# Patient Record
Sex: FEMALE | Race: BLACK OR AFRICAN AMERICAN | Employment: UNEMPLOYED | URBAN - METROPOLITAN AREA
[De-identification: names, ages, dates, MRNs, and addresses within clinical notes are randomized per-mention and may not be internally consistent; named-entity substitution may affect disease eponyms.]

---

## 2017-05-08 PROBLEM — G47.01 INSOMNIA DUE TO MEDICAL CONDITION: Status: ACTIVE | Noted: 2017-05-08

## 2017-05-08 PROBLEM — K59.00 CONSTIPATION, UNSPECIFIED: Status: ACTIVE | Noted: 2017-05-08

## 2017-05-08 PROBLEM — K21.9 GERD (GASTROESOPHAGEAL REFLUX DISEASE): Status: ACTIVE | Noted: 2017-05-08

## 2017-05-08 PROBLEM — R07.9 CHEST PAIN, UNSPECIFIED: Status: ACTIVE | Noted: 2017-05-08

## 2017-05-08 PROBLEM — F32.9 MAJOR DEPRESSIVE DISORDER WITH SINGLE EPISODE: Status: ACTIVE | Noted: 2017-05-08

## 2017-05-08 PROBLEM — I10 ESSENTIAL HYPERTENSION: Status: ACTIVE | Noted: 2017-05-08

## 2017-05-15 PROBLEM — R07.9 CHEST PAIN: Status: ACTIVE | Noted: 2017-05-15

## 2017-05-15 PROBLEM — E66.9 OBESITY: Status: ACTIVE | Noted: 2017-05-15

## 2017-05-15 PROBLEM — R00.2 PALPITATIONS: Status: ACTIVE | Noted: 2017-05-15

## 2017-06-12 ENCOUNTER — HOSPITAL ENCOUNTER (INPATIENT)
Age: 47
LOS: 2 days | Discharge: HOME | DRG: 287 | End: 2017-06-14
Attending: EMERGENCY MEDICINE | Admitting: INTERNAL MEDICINE
Payer: MEDICARE

## 2017-06-12 ENCOUNTER — APPOINTMENT (OUTPATIENT)
Dept: GENERAL RADIOLOGY | Age: 47
DRG: 287 | End: 2017-06-12
Attending: EMERGENCY MEDICINE
Payer: MEDICARE

## 2017-06-12 DIAGNOSIS — I20.0 UNSTABLE ANGINA (HCC): Primary | ICD-10-CM

## 2017-06-12 PROBLEM — R07.9 CHEST PAIN, UNSPECIFIED: Status: RESOLVED | Noted: 2017-05-08 | Resolved: 2017-06-12

## 2017-06-12 LAB
ANION GAP BLD CALC-SCNC: 9 MMOL/L (ref 7–16)
BASOPHILS # BLD AUTO: 0 K/UL (ref 0–0.2)
BASOPHILS # BLD: 0 % (ref 0–2)
BUN SERPL-MCNC: 10 MG/DL (ref 6–23)
CALCIUM SERPL-MCNC: 9.4 MG/DL (ref 8.3–10.4)
CHLORIDE SERPL-SCNC: 101 MMOL/L (ref 98–107)
CO2 SERPL-SCNC: 28 MMOL/L (ref 21–32)
CREAT SERPL-MCNC: 0.9 MG/DL (ref 0.6–1)
DIFFERENTIAL METHOD BLD: ABNORMAL
EOSINOPHIL # BLD: 0.2 K/UL (ref 0–0.8)
EOSINOPHIL NFR BLD: 3 % (ref 0.5–7.8)
ERYTHROCYTE [DISTWIDTH] IN BLOOD BY AUTOMATED COUNT: 13.5 % (ref 11.9–14.6)
GLUCOSE SERPL-MCNC: 82 MG/DL (ref 65–100)
HCT VFR BLD AUTO: 41.4 % (ref 35.8–46.3)
HGB BLD-MCNC: 14.2 G/DL (ref 11.7–15.4)
IMM GRANULOCYTES # BLD: 0 K/UL (ref 0–0.5)
IMM GRANULOCYTES NFR BLD AUTO: 0.1 % (ref 0–5)
LYMPHOCYTES # BLD AUTO: 44 % (ref 13–44)
LYMPHOCYTES # BLD: 3 K/UL (ref 0.5–4.6)
MCH RBC QN AUTO: 27.8 PG (ref 26.1–32.9)
MCHC RBC AUTO-ENTMCNC: 34.3 G/DL (ref 31.4–35)
MCV RBC AUTO: 81.2 FL (ref 79.6–97.8)
MONOCYTES # BLD: 0.4 K/UL (ref 0.1–1.3)
MONOCYTES NFR BLD AUTO: 6 % (ref 4–12)
NEUTS SEG # BLD: 3.2 K/UL (ref 1.7–8.2)
NEUTS SEG NFR BLD AUTO: 47 % (ref 43–78)
PLATELET # BLD AUTO: 437 K/UL (ref 150–450)
PMV BLD AUTO: 8.9 FL (ref 10.8–14.1)
POTASSIUM SERPL-SCNC: 4.7 MMOL/L (ref 3.5–5.1)
RBC # BLD AUTO: 5.1 M/UL (ref 4.05–5.25)
SODIUM SERPL-SCNC: 138 MMOL/L (ref 136–145)
TROPONIN I BLD-MCNC: 0.01 NG/ML (ref 0–0.08)
WBC # BLD AUTO: 6.9 K/UL (ref 4.3–11.1)

## 2017-06-12 PROCEDURE — 71020 XR CHEST PA LAT: CPT

## 2017-06-12 PROCEDURE — 80048 BASIC METABOLIC PNL TOTAL CA: CPT | Performed by: EMERGENCY MEDICINE

## 2017-06-12 PROCEDURE — 93005 ELECTROCARDIOGRAM TRACING: CPT | Performed by: EMERGENCY MEDICINE

## 2017-06-12 PROCEDURE — 84484 ASSAY OF TROPONIN QUANT: CPT

## 2017-06-12 PROCEDURE — 99284 EMERGENCY DEPT VISIT MOD MDM: CPT | Performed by: EMERGENCY MEDICINE

## 2017-06-12 PROCEDURE — 65660000000 HC RM CCU STEPDOWN

## 2017-06-12 PROCEDURE — 74011250637 HC RX REV CODE- 250/637: Performed by: NURSE PRACTITIONER

## 2017-06-12 PROCEDURE — 74011250636 HC RX REV CODE- 250/636: Performed by: NURSE PRACTITIONER

## 2017-06-12 PROCEDURE — 85025 COMPLETE CBC W/AUTO DIFF WBC: CPT | Performed by: EMERGENCY MEDICINE

## 2017-06-12 RX ORDER — ACETAMINOPHEN 325 MG/1
650 TABLET ORAL
Status: DISCONTINUED | OUTPATIENT
Start: 2017-06-12 | End: 2017-06-14 | Stop reason: HOSPADM

## 2017-06-12 RX ORDER — MORPHINE SULFATE 2 MG/ML
2 INJECTION, SOLUTION INTRAMUSCULAR; INTRAVENOUS
Status: DISCONTINUED | OUTPATIENT
Start: 2017-06-12 | End: 2017-06-14 | Stop reason: HOSPADM

## 2017-06-12 RX ORDER — GUAIFENESIN 100 MG/5ML
324 LIQUID (ML) ORAL
Status: COMPLETED | OUTPATIENT
Start: 2017-06-13 | End: 2017-06-12

## 2017-06-12 RX ORDER — PANTOPRAZOLE SODIUM 40 MG/1
40 TABLET, DELAYED RELEASE ORAL
Status: DISCONTINUED | OUTPATIENT
Start: 2017-06-13 | End: 2017-06-14 | Stop reason: HOSPADM

## 2017-06-12 RX ORDER — GUAIFENESIN 100 MG/5ML
81 LIQUID (ML) ORAL DAILY
Status: DISCONTINUED | OUTPATIENT
Start: 2017-06-13 | End: 2017-06-14 | Stop reason: HOSPADM

## 2017-06-12 RX ORDER — METOPROLOL TARTRATE 25 MG/1
25 TABLET, FILM COATED ORAL EVERY 12 HOURS
Status: DISCONTINUED | OUTPATIENT
Start: 2017-06-13 | End: 2017-06-14 | Stop reason: HOSPADM

## 2017-06-12 RX ORDER — HYDROCODONE BITARTRATE AND ACETAMINOPHEN 7.5; 325 MG/1; MG/1
1 TABLET ORAL
Status: DISCONTINUED | OUTPATIENT
Start: 2017-06-12 | End: 2017-06-14 | Stop reason: HOSPADM

## 2017-06-12 RX ORDER — SODIUM CHLORIDE 0.9 % (FLUSH) 0.9 %
5-10 SYRINGE (ML) INJECTION AS NEEDED
Status: DISCONTINUED | OUTPATIENT
Start: 2017-06-12 | End: 2017-06-14 | Stop reason: HOSPADM

## 2017-06-12 RX ORDER — NITROGLYCERIN 0.4 MG/1
0.4 TABLET SUBLINGUAL
Status: DISCONTINUED | OUTPATIENT
Start: 2017-06-12 | End: 2017-06-14 | Stop reason: HOSPADM

## 2017-06-12 RX ORDER — HEPARIN SODIUM 5000 [USP'U]/ML
4000 INJECTION, SOLUTION INTRAVENOUS; SUBCUTANEOUS ONCE
Status: COMPLETED | OUTPATIENT
Start: 2017-06-13 | End: 2017-06-12

## 2017-06-12 RX ORDER — SODIUM CHLORIDE 0.9 % (FLUSH) 0.9 %
5-10 SYRINGE (ML) INJECTION EVERY 8 HOURS
Status: DISCONTINUED | OUTPATIENT
Start: 2017-06-13 | End: 2017-06-14 | Stop reason: HOSPADM

## 2017-06-12 RX ORDER — ONDANSETRON 2 MG/ML
4 INJECTION INTRAMUSCULAR; INTRAVENOUS
Status: DISCONTINUED | OUTPATIENT
Start: 2017-06-12 | End: 2017-06-14 | Stop reason: HOSPADM

## 2017-06-12 RX ORDER — HEPARIN SODIUM 5000 [USP'U]/100ML
12-25 INJECTION, SOLUTION INTRAVENOUS
Status: DISCONTINUED | OUTPATIENT
Start: 2017-06-13 | End: 2017-06-13

## 2017-06-12 RX ADMIN — Medication 5 ML: at 23:32

## 2017-06-12 RX ADMIN — NITROGLYCERIN 1 INCH: 20 OINTMENT TOPICAL at 23:32

## 2017-06-12 RX ADMIN — HEPARIN SODIUM 4000 UNITS: 5000 INJECTION, SOLUTION INTRAVENOUS; SUBCUTANEOUS at 23:33

## 2017-06-12 RX ADMIN — METOPROLOL TARTRATE 25 MG: 25 TABLET ORAL at 23:32

## 2017-06-12 RX ADMIN — HEPARIN SODIUM AND DEXTROSE 12 UNITS/KG/HR: 5000; 5 INJECTION INTRAVENOUS at 23:37

## 2017-06-12 RX ADMIN — ASPIRIN 81 MG 324 MG: 81 TABLET ORAL at 23:33

## 2017-06-12 NOTE — IP AVS SNAPSHOT
Current Discharge Medication List  
  
START taking these medications Dose & Instructions Dispensing Information Comments Morning Noon Evening Bedtime  
 aspirin 81 mg chewable tablet Your next dose is:  Thursday morning Dose:  81 mg Take 1 Tab by mouth daily. Refills:  0 FLUoxetine 10 mg capsule Commonly known as:  PROzac Your next dose is: Tonight before bed Dose:  10 mg Take 1 Cap by mouth every eight (8) hours. Quantity:  90 Cap Refills:  0  
     
   
   
   
  
  
 hydrocortisone 25 mg Supp Commonly known as:  ANUSOL-HC Your next dose is:  Thursday morning Dose:  25 mg Insert 1 Suppository into rectum daily for 13 doses. Quantity:  13 Suppository Refills:  0  
     
   
   
   
  
 OLANZapine 2.5 mg tablet Commonly known as:  ZyPREXA Your next dose is: Tonight before bed Dose:  1.25 mg Take 0.5 Tabs by mouth every eight (8) hours. Quantity:  45 Tab Refills:  0 CONTINUE these medications which have NOT CHANGED Dose & Instructions Dispensing Information Comments Morning Noon Evening Bedtime LINZESS 290 mcg Cap capsule Generic drug:  linaclotide Your next dose is:  Thursday morning Take  by mouth Daily (before breakfast). Refills:  0  
     
   
   
   
  
 losartan-hydroCHLOROthiazide 100-25 mg per tablet Commonly known as:  HYZAAR Your next dose is:  Thursday morning Dose:  1 Tab Take 1 Tab by mouth daily. Refills:  0 NexIUM 40 mg capsule Generic drug:  esomeprazole Your next dose is:  Thursday morning Take  by mouth daily. Refills:  0 Where to Get Your Medications Information on where to get these meds will be given to you by the nurse or doctor. ! Ask your nurse or doctor about these medications FLUoxetine 10 mg capsule hydrocortisone 25 mg Supp OLANZapine 2.5 mg tablet

## 2017-06-12 NOTE — IP AVS SNAPSHOT
[address] 
[phone] Patient: Conception Contras MRN: [id number] :1970 You are allergic to the following Allergen Reactions Latex Hives Augmentin (Amoxicillin-Pot Clavulanate) Swelling Recent Documentation Height Weight BMI OB Status Smoking Status 1.727 m 154.4 kg 51.76 kg/m2 Having regular periods Current Every Day Smoker Emergency Contacts Name Discharge Info Relation Home Work Mobile Santiago Watson  Sister [23] [phone] Abi Interiano  Mother [14] [phone] About your hospitalization You were admitted on:  2017 You last received care in the:  [facility] 3 TELEMETRY You were discharged on:  2017 Unit phone number:  [phone] Why you were hospitalized Your primary diagnosis was:  Not on File Your diagnoses also included:  Palpitations, Gerd (Gastroesophageal Reflux Disease), Essential Hypertension, Bmi 50.0-59.9, Adult (Hcc), Non-Cardiac Chest Pain Providers Seen During Your Hospitalizations Provider Role Specialty Primary office phone [place] DO Kj Attending Provider Emergency Medicine [phone] Adair Mcneil DO Attending Provider Cardiology [phone] Your Primary Care Physician (PCP) Primary Care Physician Office Phone Office Fax Leonidas Soliman [phone] [address] Follow-up Information Follow up With Details Comments Contact Info [facility] On 2017 at 9:30am with Braydon Chaudhari NP in the [facility]. [facility] [id number] [phone] Stepan Mahan MD   tele monitor -15 Thursday at 10:30 [facility] office  [address] [facility] [address] 
[phone] Stepan Mahan MD   at [address] [facility] office [address] [facility] [address] 
[phone] Ada Campos MD   [address] [place] [facility] [address] 
[phone] Your Appointments Wednesday June 14, 2017  3:30 PM EDT  
[place] LEFT HEART CATH W/POSSIBLE PCI with [facility] 2 ALL EVENTS  
[facility] ([address] [place] [address]) [address] [facility] [address]  
[phone] Thursday Judy 15, 2017 10:30 AM EDT MONITOR APPLICATION with Francisco Mann [address] 121 Cardiology ([facility]) [address] 
[address] [facility] [place] [address]  
[phone] Thursday July 13, 2017  1:15 PM EDT Office Visit with Archana Medeiros MD  
[address] [facility] [address] [address] [facility] [address] Cardiology ([address] [place] [address]) [address] 
[address] [facility]  
[phone] Current Discharge Medication List  
  
START taking these medications Dose & Instructions Dispensing Information Comments Morning Noon Evening Bedtime  
 aspirin 81 mg chewable tablet Your next dose is:  Thursday morning Dose:  81 mg Take 1 Tab by mouth daily. Refills:  0 FLUoxetine 10 mg capsule Commonly known as:  PROzac Your next dose is: Tonight before bed Dose:  10 mg Take 1 Cap by mouth every eight (8) hours. Quantity:  90 Cap Refills:  0  
     
   
   
   
  
  
 hydrocortisone 25 mg Supp Commonly known as:  ANUSOL-HC Your next dose is:  Thursday morning Dose:  25 mg Insert 1 Suppository into rectum daily for 13 doses. Quantity:  13 Suppository Refills:  0  
     
   
   
   
  
 OLANZapine 2.5 mg tablet Commonly known as:  ZyPREXA Your next dose is: Tonight before bed Dose:  1.25 mg Take 0.5 Tabs by mouth every eight (8) hours. Quantity:  45 Tab Refills:  0 CONTINUE these medications which have NOT CHANGED Dose & Instructions Dispensing Information Comments Morning Noon Evening Bedtime LINZESS 290 mcg Cap capsule Generic drug:  linaclotide Your next dose is:  Thursday morning Take  by mouth Daily (before breakfast). Refills:  0  
     
   
   
   
  
 losartan-hydroCHLOROthiazide 100-25 mg per tablet Commonly known as:  HYZAAR Your next dose is:  Thursday morning Dose:  1 Tab Take 1 Tab by mouth daily. Refills:  0 NexIUM 40 mg capsule Generic drug:  esomeprazole Your next dose is:  Thursday morning Take  by mouth daily. Refills:  0 Where to Get Your Medications Information on where to get these meds will be given to you by the nurse or doctor. ! Ask your nurse or doctor about these medications FLUoxetine 10 mg capsule  
 hydrocortisone 25 mg Supp OLANZapine 2.5 mg tablet Discharge Instructions Angina: Care Instructions Your Care Instructions You have a problem called angina. Angina happens when there is not enough blood flow to your heart muscle. Angina is a sign of coronary artery disease (CAD). CAD occurs when blood vessels that supply the heart become narrowed. Having CAD increases your risk of a heart attack. Chest pain or pressure is the most common symptom of angina. But some people have other symptoms, like: 
· Pain, pressure, or a strange feeling in the back, neck, jaw, or upper belly, or in one or both shoulders or arms. · Shortness of breath. · Nausea or vomiting. · Lightheadedness or sudden weakness. · Fast or irregular heartbeat. Women are somewhat more likely than men to have angina symptoms like shortness of breath, nausea, and back or jaw pain. Angina can be dangerous. That's why it is important to pay attention to your symptoms. Know what is typical for you, learn how to control your symptoms, and understand when you need to get treatment. A change in your usual pattern of symptoms is an emergency. It may mean that you are having a heart attack. The doctor has checked you carefully, but problems can develop later. If you notice any problems or new symptoms, get medical treatment right away. Follow-up care is a key part of your treatment and safety. Be sure to make and go to all appointments, and call your doctor if you are having problems. It's also a good idea to know your test results and keep a list of the medicines you take. How can you care for yourself at home? Medicines · If your doctor has given you nitroglycerin for angina symptoms, keep it with you at all times. If you have symptoms, sit down and rest, and take the first dose of nitroglycerin as directed. If your symptoms get worse or are not getting better within 5 minutes, call 911 right away. Stay on the phone. The emergency  will give you further instructions. · If your doctor advises it, take 1 low-dose aspirin a day to prevent heart attack. · Be safe with medicines. Take your medicines exactly as prescribed. Call your doctor if you think you are having a problem with your medicine. You will get more details on the specific medicines your doctor prescribes. Lifestyle changes · Do not smoke. If you need help quitting, talk to your doctor about stop-smoking programs and medicines. These can increase your chances of quitting for good. · Eat a heart-healthy diet that is low in saturated fat and salt, and is high in fiber. Talk to your doctor or a dietitian about healthy eating. · Stay at a healthy weight. Or lose weight if you need to. Activity · Talk to your doctor about a level of activity that is safe for you. · If an activity causes angina symptoms, stop and rest. 
When should you call for help? Call 911 anytime you think you may need emergency care. For example, call if: 
· You passed out (lost consciousness). · You have symptoms of a heart attack. These may include: ¨ Chest pain or pressure, or a strange feeling in the chest. 
¨ Sweating. ¨ Shortness of breath. ¨ Nausea or vomiting. ¨ Pain, pressure, or a strange feeling in the back, neck, jaw, or upper belly or in one or both shoulders or arms. ¨ Lightheadedness or sudden weakness. ¨ A fast or irregular heartbeat. After you call 911, the  may tell you to chew 1 adult-strength or 2 to 4 low-dose aspirin. Wait for an ambulance. Do not try to drive yourself. · You have angina symptoms that do not go away with rest or are not getting better within 5 minutes after you take a dose of nitroglycerin. Call your doctor now or seek immediate medical care if: 
· You are having angina symptoms more often than usual, or they are different or worse than usual. 
· You feel dizzy or lightheaded, or you feel like you may faint. Watch closely for changes in your health, and be sure to contact your doctor if you have any problems. Where can you learn more? Go to http://harshTaste Kitchenjimmie.info/. Enter H129 in the search box to learn more about \"Angina: Care Instructions. \" Current as of: January 27, 2016 Content Version: 11.2 © 3196-6595 [employer]. Care instructions adapted under license by [employer] (which disclaims liability or warranty for this information). If you have questions about a medical condition or this instruction, always ask your healthcare professional. [place] [address] any warranty or liability for your use of this information. Discharge Orders None [facility] Announcement We are excited to announce that we are making your provider's discharge notes available to you in [place]. You will see these notes when they are completed and signed by the physician that discharged you from your recent hospital stay.   If you have any questions or concerns about any information you see in [employer], please call the Health Information Department where you were seen or reach out to your Primary Care Provider for more information about your plan of care. Introducing [facility] & HEALTH SERVICES! [facility] introduces [employer] patient portal. Now you can access parts of your medical record, email your doctor's office, and request medication refills online. 1. In your internet browser, go to [email or web address] 2. Click on the First Time User? Click Here link in the Sign In box. You will see the New Member Sign Up page. 3. Enter your [employer] Access Code exactly as it appears below. You will not need to use this code after youve completed the sign-up process. If you do not sign up before the expiration date, you must request a new code. · [employer] Access Code: [email or web address] Expires: 8/13/2017 12:12 PM 
 
4. Enter the last four digits of your Social Security Number (xxxx) and Date of Birth (mm/dd/yyyy) as indicated and click Submit. You will be taken to the next sign-up page. 5. Create a [employer] ID. This will be your [employer] login ID and cannot be changed, so think of one that is secure and easy to remember. 6. Create a [employer] password. You can change your password at any time. 7. Enter your Password Reset Question and Answer. This can be used at a later time if you forget your password. 8. Enter your e-mail address. You will receive e-mail notification when new information is available in [address] 9. Click Sign Up. You can now view and download portions of your medical record. 10. Click the Download Summary menu link to download a portable copy of your medical information. If you have questions, please visit the Frequently Asked Questions section of the [employer] website. Remember, [employer] is NOT to be used for urgent needs. For medical emergencies, dial 911. Now available from your iPhone and Android! General Information Please provide this summary of care documentation to your next provider. Patient Signature:  ____________________________________________________________ Date:  ____________________________________________________________  
  
Neita Hidden Provider Signature:  ____________________________________________________________ Date:  ____________________________________________________________

## 2017-06-13 LAB
ALBUMIN SERPL BCP-MCNC: 3 G/DL (ref 3.5–5)
ALBUMIN/GLOB SERPL: 0.7 {RATIO} (ref 1.2–3.5)
ALP SERPL-CCNC: 65 U/L (ref 50–136)
ALT SERPL-CCNC: 19 U/L (ref 12–65)
ANION GAP BLD CALC-SCNC: 8 MMOL/L (ref 7–16)
APTT PPP: 38.5 SEC (ref 23.5–31.7)
AST SERPL W P-5'-P-CCNC: 18 U/L (ref 15–37)
ATRIAL RATE: 90 BPM
BILIRUB DIRECT SERPL-MCNC: <0.1 MG/DL
BILIRUB SERPL-MCNC: 0.2 MG/DL (ref 0.2–1.1)
BUN SERPL-MCNC: 11 MG/DL (ref 6–23)
CALCIUM SERPL-MCNC: 8.8 MG/DL (ref 8.3–10.4)
CALCULATED P AXIS, ECG09: 24 DEGREES
CALCULATED R AXIS, ECG10: 81 DEGREES
CALCULATED T AXIS, ECG11: 46 DEGREES
CHLORIDE SERPL-SCNC: 102 MMOL/L (ref 98–107)
CHOLEST SERPL-MCNC: 174 MG/DL
CO2 SERPL-SCNC: 29 MMOL/L (ref 21–32)
COLLECTION COMMENT, COLCM: NORMAL
CREAT SERPL-MCNC: 0.74 MG/DL (ref 0.6–1)
DIAGNOSIS, 93000: NORMAL
ERYTHROCYTE [DISTWIDTH] IN BLOOD BY AUTOMATED COUNT: 13.6 % (ref 11.9–14.6)
GLOBULIN SER CALC-MCNC: 4.1 G/DL (ref 2.3–3.5)
GLUCOSE SERPL-MCNC: 89 MG/DL (ref 65–100)
HCT VFR BLD AUTO: 37.9 % (ref 35.8–46.3)
HDLC SERPL-MCNC: 70 MG/DL (ref 40–60)
HDLC SERPL: 2.5 {RATIO}
HGB BLD-MCNC: 12.8 G/DL (ref 11.7–15.4)
LDLC SERPL CALC-MCNC: 91.2 MG/DL
LIPID PROFILE,FLP: ABNORMAL
MCH RBC QN AUTO: 27.3 PG (ref 26.1–32.9)
MCHC RBC AUTO-ENTMCNC: 33.8 G/DL (ref 31.4–35)
MCV RBC AUTO: 80.8 FL (ref 79.6–97.8)
P-R INTERVAL, ECG05: 142 MS
PLATELET # BLD AUTO: 409 K/UL (ref 150–450)
PMV BLD AUTO: 9 FL (ref 10.8–14.1)
POTASSIUM SERPL-SCNC: 3.9 MMOL/L (ref 3.5–5.1)
PROT SERPL-MCNC: 7.1 G/DL (ref 6.3–8.2)
Q-T INTERVAL, ECG07: 366 MS
QRS DURATION, ECG06: 86 MS
QTC CALCULATION (BEZET), ECG08: 447 MS
RBC # BLD AUTO: 4.69 M/UL (ref 4.05–5.25)
SODIUM SERPL-SCNC: 139 MMOL/L (ref 136–145)
TRIGL SERPL-MCNC: 64 MG/DL (ref 35–150)
TROPONIN I SERPL-MCNC: <0.02 NG/ML (ref 0.02–0.05)
VENTRICULAR RATE, ECG03: 90 BPM
VLDLC SERPL CALC-MCNC: 12.8 MG/DL (ref 6–23)
WBC # BLD AUTO: 6.1 K/UL (ref 4.3–11.1)

## 2017-06-13 PROCEDURE — 65660000000 HC RM CCU STEPDOWN

## 2017-06-13 PROCEDURE — 80061 LIPID PANEL: CPT | Performed by: NURSE PRACTITIONER

## 2017-06-13 PROCEDURE — 99407 BEHAV CHNG SMOKING > 10 MIN: CPT

## 2017-06-13 PROCEDURE — C1769 GUIDE WIRE: HCPCS

## 2017-06-13 PROCEDURE — 93306 TTE W/DOPPLER COMPLETE: CPT

## 2017-06-13 PROCEDURE — C1894 INTRO/SHEATH, NON-LASER: HCPCS

## 2017-06-13 PROCEDURE — 85730 THROMBOPLASTIN TIME PARTIAL: CPT | Performed by: INTERNAL MEDICINE

## 2017-06-13 PROCEDURE — 99152 MOD SED SAME PHYS/QHP 5/>YRS: CPT

## 2017-06-13 PROCEDURE — 74011250637 HC RX REV CODE- 250/637: Performed by: NURSE PRACTITIONER

## 2017-06-13 PROCEDURE — 36415 COLL VENOUS BLD VENIPUNCTURE: CPT | Performed by: NURSE PRACTITIONER

## 2017-06-13 PROCEDURE — 80076 HEPATIC FUNCTION PANEL: CPT | Performed by: INTERNAL MEDICINE

## 2017-06-13 PROCEDURE — 80048 BASIC METABOLIC PNL TOTAL CA: CPT | Performed by: NURSE PRACTITIONER

## 2017-06-13 PROCEDURE — 93458 L HRT ARTERY/VENTRICLE ANGIO: CPT

## 2017-06-13 PROCEDURE — 77030004534 HC CATH ANGI DX INFN CARD -A

## 2017-06-13 PROCEDURE — B2151ZZ FLUOROSCOPY OF LEFT HEART USING LOW OSMOLAR CONTRAST: ICD-10-PCS | Performed by: INTERNAL MEDICINE

## 2017-06-13 PROCEDURE — 85027 COMPLETE CBC AUTOMATED: CPT | Performed by: NURSE PRACTITIONER

## 2017-06-13 PROCEDURE — 77030015766

## 2017-06-13 PROCEDURE — 74011250636 HC RX REV CODE- 250/636: Performed by: NURSE PRACTITIONER

## 2017-06-13 PROCEDURE — B2111ZZ FLUOROSCOPY OF MULTIPLE CORONARY ARTERIES USING LOW OSMOLAR CONTRAST: ICD-10-PCS | Performed by: INTERNAL MEDICINE

## 2017-06-13 PROCEDURE — 74011250636 HC RX REV CODE- 250/636: Performed by: INTERNAL MEDICINE

## 2017-06-13 PROCEDURE — 77030029997 HC DEV COM RDL R BND TELE -B

## 2017-06-13 PROCEDURE — 74011636320 HC RX REV CODE- 636/320: Performed by: INTERNAL MEDICINE

## 2017-06-13 PROCEDURE — 4A023N7 MEASUREMENT OF CARDIAC SAMPLING AND PRESSURE, LEFT HEART, PERCUTANEOUS APPROACH: ICD-10-PCS | Performed by: INTERNAL MEDICINE

## 2017-06-13 PROCEDURE — 84484 ASSAY OF TROPONIN QUANT: CPT | Performed by: NURSE PRACTITIONER

## 2017-06-13 PROCEDURE — 74011250636 HC RX REV CODE- 250/636

## 2017-06-13 PROCEDURE — 74011000250 HC RX REV CODE- 250: Performed by: INTERNAL MEDICINE

## 2017-06-13 RX ORDER — SODIUM CHLORIDE 0.9 % (FLUSH) 0.9 %
5-10 SYRINGE (ML) INJECTION AS NEEDED
Status: DISCONTINUED | OUTPATIENT
Start: 2017-06-13 | End: 2017-06-14 | Stop reason: HOSPADM

## 2017-06-13 RX ORDER — HEPARIN SODIUM 200 [USP'U]/100ML
25 INJECTION, SOLUTION INTRAVENOUS CONTINUOUS
Status: DISCONTINUED | OUTPATIENT
Start: 2017-06-13 | End: 2017-06-13

## 2017-06-13 RX ORDER — POLYETHYLENE GLYCOL 3350 17 G/17G
34 POWDER, FOR SOLUTION ORAL DAILY
Status: DISCONTINUED | OUTPATIENT
Start: 2017-06-14 | End: 2017-06-14 | Stop reason: HOSPADM

## 2017-06-13 RX ORDER — LIDOCAINE HYDROCHLORIDE 20 MG/ML
1-20 INJECTION, SOLUTION INFILTRATION; PERINEURAL ONCE
Status: COMPLETED | OUTPATIENT
Start: 2017-06-13 | End: 2017-06-13

## 2017-06-13 RX ORDER — HYDROCORTISONE ACETATE 25 MG/1
25 SUPPOSITORY RECTAL DAILY
Status: DISCONTINUED | OUTPATIENT
Start: 2017-06-14 | End: 2017-06-14 | Stop reason: HOSPADM

## 2017-06-13 RX ORDER — SODIUM CHLORIDE 0.9 % (FLUSH) 0.9 %
5-10 SYRINGE (ML) INJECTION EVERY 8 HOURS
Status: DISCONTINUED | OUTPATIENT
Start: 2017-06-13 | End: 2017-06-14 | Stop reason: HOSPADM

## 2017-06-13 RX ORDER — FENTANYL CITRATE 50 UG/ML
25-100 INJECTION, SOLUTION INTRAMUSCULAR; INTRAVENOUS
Status: DISCONTINUED | OUTPATIENT
Start: 2017-06-13 | End: 2017-06-13

## 2017-06-13 RX ORDER — HEPARIN SODIUM 5000 [USP'U]/ML
35 INJECTION, SOLUTION INTRAVENOUS; SUBCUTANEOUS ONCE
Status: COMPLETED | OUTPATIENT
Start: 2017-06-13 | End: 2017-06-13

## 2017-06-13 RX ORDER — DIPHENHYDRAMINE HCL 25 MG
25 CAPSULE ORAL
Status: DISCONTINUED | OUTPATIENT
Start: 2017-06-13 | End: 2017-06-14 | Stop reason: HOSPADM

## 2017-06-13 RX ORDER — SODIUM CHLORIDE 9 MG/ML
75 INJECTION, SOLUTION INTRAVENOUS CONTINUOUS
Status: DISPENSED | OUTPATIENT
Start: 2017-06-13 | End: 2017-06-13

## 2017-06-13 RX ORDER — MIDAZOLAM HYDROCHLORIDE 1 MG/ML
1-6 INJECTION, SOLUTION INTRAMUSCULAR; INTRAVENOUS
Status: DISCONTINUED | OUTPATIENT
Start: 2017-06-13 | End: 2017-06-13

## 2017-06-13 RX ADMIN — PANTOPRAZOLE SODIUM 40 MG: 40 TABLET, DELAYED RELEASE ORAL at 06:28

## 2017-06-13 RX ADMIN — ASPIRIN 81 MG 81 MG: 81 TABLET ORAL at 08:49

## 2017-06-13 RX ADMIN — FENTANYL CITRATE 25 MCG: 50 INJECTION, SOLUTION INTRAMUSCULAR; INTRAVENOUS at 14:25

## 2017-06-13 RX ADMIN — METOPROLOL TARTRATE 25 MG: 25 TABLET ORAL at 22:17

## 2017-06-13 RX ADMIN — LIDOCAINE HYDROCHLORIDE 60 MG: 20 INJECTION, SOLUTION INFILTRATION; PERINEURAL at 14:30

## 2017-06-13 RX ADMIN — PANTOPRAZOLE SODIUM 40 MG: 40 TABLET, DELAYED RELEASE ORAL at 16:36

## 2017-06-13 RX ADMIN — HEPARIN SODIUM 5300 UNITS: 5000 INJECTION, SOLUTION INTRAVENOUS; SUBCUTANEOUS at 06:23

## 2017-06-13 RX ADMIN — FENTANYL CITRATE 25 MCG: 50 INJECTION, SOLUTION INTRAMUSCULAR; INTRAVENOUS at 14:28

## 2017-06-13 RX ADMIN — METOPROLOL TARTRATE 25 MG: 25 TABLET ORAL at 08:49

## 2017-06-13 RX ADMIN — IOPAMIDOL 60 ML: 755 INJECTION, SOLUTION INTRAVENOUS at 14:45

## 2017-06-13 RX ADMIN — Medication 5 ML: at 16:37

## 2017-06-13 RX ADMIN — FENTANYL CITRATE 25 MCG: 50 INJECTION, SOLUTION INTRAMUSCULAR; INTRAVENOUS at 14:32

## 2017-06-13 RX ADMIN — MIDAZOLAM HYDROCHLORIDE 1 MG: 1 INJECTION, SOLUTION INTRAMUSCULAR; INTRAVENOUS at 14:32

## 2017-06-13 RX ADMIN — Medication 10 ML: at 22:21

## 2017-06-13 RX ADMIN — HEPARIN SODIUM 2 ML: 10000 INJECTION, SOLUTION INTRAVENOUS; SUBCUTANEOUS at 14:36

## 2017-06-13 RX ADMIN — DIPHENHYDRAMINE HYDROCHLORIDE 25 MG: 25 CAPSULE ORAL at 23:42

## 2017-06-13 RX ADMIN — NITROGLYCERIN 1 INCH: 20 OINTMENT TOPICAL at 12:00

## 2017-06-13 RX ADMIN — ACETAMINOPHEN 650 MG: 325 TABLET ORAL at 05:45

## 2017-06-13 RX ADMIN — HEPARIN SODIUM 25 ML/HR: 200 INJECTION, SOLUTION INTRAVENOUS at 14:12

## 2017-06-13 RX ADMIN — HYDROCODONE BITARTRATE AND ACETAMINOPHEN 1 TABLET: 7.5; 325 TABLET ORAL at 22:17

## 2017-06-13 RX ADMIN — HYDROCHLOROTHIAZIDE: 25 TABLET ORAL at 08:49

## 2017-06-13 RX ADMIN — MIDAZOLAM HYDROCHLORIDE 1 MG: 1 INJECTION, SOLUTION INTRAMUSCULAR; INTRAVENOUS at 14:25

## 2017-06-13 RX ADMIN — ACETAMINOPHEN 650 MG: 325 TABLET ORAL at 16:44

## 2017-06-13 RX ADMIN — NITROGLYCERIN 1 INCH: 20 OINTMENT TOPICAL at 05:39

## 2017-06-13 RX ADMIN — MIDAZOLAM HYDROCHLORIDE 1 MG: 1 INJECTION, SOLUTION INTRAMUSCULAR; INTRAVENOUS at 14:28

## 2017-06-13 RX ADMIN — MIDAZOLAM HYDROCHLORIDE 1 MG: 1 INJECTION, SOLUTION INTRAMUSCULAR; INTRAVENOUS at 14:36

## 2017-06-13 RX ADMIN — SODIUM CHLORIDE 75 ML/HR: 900 INJECTION, SOLUTION INTRAVENOUS at 16:37

## 2017-06-13 NOTE — PROGRESS NOTES
Bedside and Verbal shift change report given to self and Beth Varghese RN (oncoming nurse) by Leonid Wilcox RN (offgoing nurse). Report included the following information SBAR, Kardex, Intake/Output and Recent Results.

## 2017-06-13 NOTE — CONSULTS
[facility] Consult Note    Viktoria Awad,  1970       Primary GI Physician: new    Referring Physician:  Dr Justin Bowman Date:  2017    Admit Date:  2017    Chief Complaint:  Rectal bleeding    Subjective:     History of Present Illness:  Patient is a 42 y.o. female who is seen in consultation at the request of Dr Connor Harrington for evaluation of rectal bleeding. She was admitted yesterday by cardiology for substernal chest pain/pressure, reporting associated SOB and palpitations. She has had the chest pain off and on for 2-3 yrs, with prior negative cardiac cath at [facility], but has had increased pain over the past few months. Recent nuclear stress test with [facility] was abnormal with anterior ischemia and she was scheduled for a heart cath later this week. Troponin in the ER was negative and EKG noted SR with inverted T waves in V1-3. Left heart cath is planned for this admission but patient has reported both vaginal and rectal bleeding, as well as abdominal pain. She reports prior hx of EGD in the past for heartburn, performed at [facility], but cannot recall findings, only that she was placed on Bentyl afterwards which seemed to help for a period. She has had no colonoscopy but reports recurrent red rectal bleeding in the last 2 months, both with and without a stool. She often feels the bleeding is also vaginal, even when she is not due for her menstrual cycle. She sees this more when she has taken ibuprofen. She also notes some cramping LLQ pain, often relieved after a stool. She denies rectal pain with stools and reports no hx of constipation, however Christene Apley is documented in her home medication list.  Hgb was 14.2 on admission, down to 12.8 with overnight hydration. Patient is an everyday tobacco user but denies use of etoh. PMH includes GERD, HTN, hypoglycemia, morbid obesity, and recurrent low back pain.     PMH:  Past Medical History:   Diagnosis Date    Acid reflux     Hypertension     Hypoglycemia     Left leg pain     Low back pain        PSH:  Past Surgical History:   Procedure Laterality Date    HX HEART CATHETERIZATION      HX ORTHOPAEDIC Right 2006    knee        Allergies: Allergies   Allergen Reactions    Latex Hives    Augmentin [Amoxicillin-Pot Clavulanate] Swelling       Home Medications:  Prior to Admission medications    Medication Sig Start Date End Date Taking? Authorizing Provider   losartan-hydroCHLOROthiazide (HYZAAR) 100-25 mg per tablet Take 1 Tab by mouth daily. Yes Historical Provider   esomeprazole (NEXIUM) 40 mg capsule Take  by mouth daily. Yes Historical Provider   linaclotide [facility]) 290 mcg cap capsule Take  by mouth Daily (before breakfast).    Yes Historical Provider       Hospital Medications:  Current Facility-Administered Medications   Medication Dose Route Frequency    pantoprazole (PROTONIX) tablet 40 mg  40 mg Oral ACB&D    linaclotide (LINZESS) capsule 290 mcg (patient supplied)  290 mcg Oral ACB    heparin 25,000 units in dextrose 500 mL infusion  12-25 Units/kg/hr (Adjusted) IntraVENous TITRATE    sodium chloride (NS) flush 5-10 mL  5-10 mL IntraVENous Q8H    sodium chloride (NS) flush 5-10 mL  5-10 mL IntraVENous PRN    aspirin chewable tablet 81 mg  81 mg Oral DAILY    nitroglycerin (NITROBID) 2 % ointment 1 Inch  1 Inch Topical Q6H    nitroglycerin (NITROSTAT) tablet 0.4 mg  0.4 mg SubLINGual Q5MIN PRN    morphine injection 2 mg  2 mg IntraVENous Q4H PRN    acetaminophen (TYLENOL) tablet 650 mg  650 mg Oral Q4H PRN    HYDROcodone-acetaminophen (NORCO) 7.5-325 mg per tablet 1 Tab  1 Tab Oral Q4H PRN    ondansetron (ZOFRAN) injection 4 mg  4 mg IntraVENous Q4H PRN    metoprolol tartrate (LOPRESSOR) tablet 25 mg  25 mg Oral Q12H    losartan/hydroCHLOROthiazide (HYZAAR) 100/25 mg   Oral DAILY       Social History:  Social History   Substance Use Topics    Smoking status: Current Every Day Smoker Packs/day: 0.25    Smokeless tobacco: Not on file    Alcohol use No         Family History:  Family History   Problem Relation Age of Onset    Heart Failure Father     Coronary Artery Disease Father     Anemia Mother     Diabetes Mother     Hypertension Mother     Cancer Other        Review of Systems:  A detailed 10 system ROS is obtained, with pertinent positives as listed above. All others are negative. Diet:  npo    Objective:     Physical Exam:  Vitals:  Visit Vitals    /66    Pulse 78    Temp 98.2 °F (36.8 °C)    Resp 18    Ht 5' 8\" (1.727 m)    Wt 151.8 kg (334 lb 9.6 oz)    LMP 06/05/2017    SpO2 96%    BMI 50.88 kg/m2     Gen:  Pt is alert, cooperative, no acute distress  Skin:  Extremities and face reveal no rashes. HEENT: Sclerae anicteric. Extra-occular muscles are intact. No oral ulcers. No abnormal pigmentation of the lips. The neck is supple. Cardiovascular: Regular rate and rhythm. No murmurs, gallops, or rubs. Respiratory:  Comfortable breathing with no accessory muscle use. Clear breath sounds anteriorly with no wheezes, rales, or rhonchi. GI:  Abdomen nondistended, soft, and nontender. Normal active bowel sounds. No enlargement of the liver or spleen. No masses palpable. Rectal:  Deferred  Musculoskeletal:  No pitting edema of the lower legs. Neurological:  Gross memory appears intact. Patient is alert and oriented. Psychiatric:  Mood appears appropriate with judgement intact. Lymphatic:  No cervical or supraclavicular adenopathy.     Laboratory:    Recent Labs      06/13/17   0441  06/12/17   2038   WBC  6.1  6.9   HGB  12.8  14.2   HCT  37.9  41.4   PLT  409  437   MCV  80.8  81.2   NA  139  138   K  3.9  4.7   CL  102  101   CO2  29  28   BUN  11  10   CREA  0.74  0.90   CA  8.8  9.4   GLU  89  82   APTT  38.5*   --           Assessment:     Principal Problem:    Chest pain (5/15/2017)    Active Problems:    GERD (gastroesophageal reflux disease) (5/8/2017)      Essential hypertension (5/8/2017)      BMI 50.0-59.9, adult ([facility] [address] (5/8/2017)      Palpitations (5/15/2017)      Rectal bleeding    Plan:     41 yo female is seen in consultation upon referral from Dr Modesta Rincon for evaluation of red rectal bleeding, occurring both with and without stool in the last 2 months. She often has vaginal bleeding as well, that she does not believe is related to her menses. She reports LLQ pain, often relieved by a stool. She notes prior EGD elsewhere, about 3 years ago, for heartburn, and remains on daily Nexium. She has been given Bentyl in the past for her abdominal pain which initially helped. She is now on Linzess but denies hx of constipation. She denies hx of prior colonoscopy. She has been noted to have an abnormal stress test per cardiology and is scheduled for [facility] today, given inversion of ST waves noted on admission EKG. She has been placed on a heparin gtt. Hgb is normal at 12.8 with MCV 80.      1.  We will plan gi followup as outpatient if colonoscopy needed. We will follow hgb and can treat for anal outlet disease with steroid supp. She has hx of constipation for which she takes Linzess which makes likely cause of bleeding hemorrhoidal.  2. Defer endoscopy for now; ok to proceed with [facility] from a gi standpoint    Patient is seen and examined in collaboration with Dr. Pascale Conner. Assessment and plan as per Dr. Pascale Conner.   Rowena Jackson NP

## 2017-06-13 NOTE — ED PROVIDER NOTES
HPI Comments: Patient presents from her prominent at the instruction of the cardiologist due to continued episodes of chest pressure and shortness of breath. The patient is a somewhat poor historian and is difficult to get specific answers out of her as far as duration of the discomfort exact times of onset and resolution. She states the symptoms began spontaneously and resolved spontaneously she does not take aspirin a day because it causes bleeding. She had a recent Cardiolite stress test done on 1 June which upon reviewed his positive for anterior wall ischemic changes. The patient states she was scheduled for a cardiac catheterization in 2 days however due to the continued episodes came to the emergency department. Patient is currently symptom-free    Patient is a 42 y.o. female presenting with chest pain. The history is provided by the patient. Chest Pain (Angina)    This is a recurrent problem. Episode onset: a few months ago. The problem has not changed since onset. Episode Length: Variable duration. The problem occurs daily (Multiple episodes daily). The pain is associated with normal activity and rest. The pain is present in the substernal region. The pain is at a severity of 7/10. The patient is experiencing no pain. The quality of the pain is described as pressure-like. Radiates to: occasional radiation to the back as well as the back of the neck and the right side of the jaw. The symptoms are aggravated by exertion. Associated symptoms include exertional chest pressure and shortness of breath. Pertinent negatives include no abdominal pain, no claudication, no cough, no diaphoresis, no dizziness, no fever, no headaches, no hemoptysis, no irregular heartbeat, no leg pain, no lower extremity edema, no malaise/fatigue, no nausea, no near-syncope, no numbness, no orthopnea, no palpitations, no PND, no sputum production, no vomiting and no weakness. She has tried nothing for the symptoms.  The treatment provided no relief. Risk factors include obesity and hypertension. Procedural history includes cardiac catheterization and stress thallium. Past Medical History:   Diagnosis Date    Acid reflux     Hypertension     Hypoglycemia     Left leg pain     Low back pain        Past Surgical History:   Procedure Laterality Date    HX HEART CATHETERIZATION      HX ORTHOPAEDIC Right 2006    knee          Family History:   Problem Relation Age of Onset    Heart Failure Father     Coronary Artery Disease Father     Anemia Mother     Diabetes Mother     Hypertension Mother     Cancer Other        Social History     Social History    Marital status: SINGLE     Spouse name: N/A    Number of children: N/A    Years of education: N/A     Occupational History    Not on file. Social History Main Topics    Smoking status: Current Every Day Smoker     Packs/day: 0.25    Smokeless tobacco: Not on file    Alcohol use No    Drug use: Not on file    Sexual activity: Not on file     Other Topics Concern    Not on file     Social History Narrative         ALLERGIES: Latex and Augmentin [amoxicillin-pot clavulanate]    Review of Systems   Constitutional: Negative for diaphoresis, fever and malaise/fatigue. Respiratory: Positive for shortness of breath. Negative for cough, hemoptysis and sputum production. Cardiovascular: Positive for chest pain. Negative for palpitations, orthopnea, claudication, PND and near-syncope. Gastrointestinal: Negative for abdominal pain, nausea and vomiting. Neurological: Negative for dizziness, weakness, numbness and headaches. All other systems reviewed and are negative. Vitals:    06/12/17 2026   BP: 138/88   Pulse: 88   Resp: 20   Temp: 98.9 °F (37.2 °C)   SpO2: 100%   Weight: 156.5 kg (345 lb)   Height: 5' 8\" (1.727 m)            Physical Exam   Constitutional: She is oriented to person, place, and time. She appears well-developed and well-nourished. No distress. HENT:   Head: Normocephalic and atraumatic. Eyes: Conjunctivae and EOM are normal. Pupils are equal, round, and reactive to light. Neck: Normal range of motion. Neck supple. Cardiovascular: Normal rate, regular rhythm, normal heart sounds and intact distal pulses. Pulmonary/Chest: Effort normal and breath sounds normal.   Abdominal: Soft. Bowel sounds are normal.   Musculoskeletal: Normal range of motion. She exhibits no edema, tenderness or deformity. Neurological: She is alert and oriented to person, place, and time. Skin: Skin is warm and dry. Psychiatric: She has a normal mood and affect. Her behavior is normal.   Nursing note and vitals reviewed.        MDM  Number of Diagnoses or Management Options  Diagnosis management comments: Case will be discussed with on-call cardiology       Amount and/or Complexity of Data Reviewed  Clinical lab tests: ordered and reviewed  Tests in the radiology section of CPT®: ordered and reviewed  Independent visualization of images, tracings, or specimens: yes (EKG at 2020: Normal sinus rhythm, rate of 90 no acute ischemic changes)    Risk of Complications, Morbidity, and/or Mortality  Presenting problems: high  Diagnostic procedures: moderate  Management options: moderate    Patient Progress  Patient progress: stable    ED Course       Procedures

## 2017-06-13 NOTE — PROGRESS NOTES
Bedside and Verbal shift change report given to Lou Dejesus RN (oncoming nurse) by self and Tina Burnham RN (offgoing nurse). Report included the following information SBAR, Kardex, MAR and Recent Results.

## 2017-06-13 NOTE — ED TRIAGE NOTES
Chest pain started since last night off and on and that someone is standing on her chest and it is hurting between her shoulder blades and down her back. Denies vomiting, states that she has had some sob, nausea and feeling like headed.

## 2017-06-13 NOTE — ED NOTES
TRANSFER - OUT REPORT:    Verbal report given to Sabina (name) on Day Powers  being transferred to [facility] (unit) for routine progression of care       Report consisted of patients Situation, Background, Assessment and   Recommendations(SBAR). Information from the following report(s) SBAR was reviewed with the receiving nurse. Lines:       Opportunity for questions and clarification was provided.       Patient transported with:   Abilio Mccullough

## 2017-06-13 NOTE — H&P
[facility] Cardiology History & Physical      Date of  Admission: 6/12/2017  8:28 PM     Primary Care Physician: Dr. Rox Virk  Primary Cardiologist: Dr. Christy Vidal  Admitting Physician: Dr. Adriano Bahena    CC: chest pain    HPI:  Yany Camp is a 42 y.o. female with past medical history of tobacco abuse, acid reflux, hypoglycemia, and HTN who presented to the ER with complaints of chest pain. She describes her pain as pressure-like in nature that is present in her substernal region with radiation in between her shoulder blades. Associated symptoms include shortness of breath and palpitations. Patient states that her symptoms have been present for approximately 2-3 years. She reports having cardiac evaluation done at [facility] during that time that she reports as normal including cardiac catheterization. She states that over the past several months her symptoms have become worse and she was sent to our office for evaluation. She had Lexiscan nuclear stress test done on 6/1 that was reported as abnormal with anterior ischemia. She was scheduled for an outpatient C on Wednesday of this week, but due to continued symptoms she came to the ER tonight for evaluation. Upon arrival to the ER, initial troponin was negative. EKG shows SR with inverted T waves in V1-V3. She is currently chest pain free. She was not given ASA in the ER due to report that after taking ASA and /or NSAIDs because it causes bleeding. When to elaborate on this, she states that she begins to pass large blood clots in her stool. She reports having an EGD several years ago and was started on PPI, but states that most of those drugs do not work any longer. She reports her symptoms at that time as more burning-like in nature vs pressure.       Past Medical History:   Diagnosis Date    Acid reflux     Hypertension     Hypoglycemia     Left leg pain     Low back pain       Past Surgical History:   Procedure Laterality Date    HX HEART CATHETERIZATION      HX ORTHOPAEDIC Right 2006    knee        Allergies   Allergen Reactions    Latex Hives    Augmentin [Amoxicillin-Pot Clavulanate] Swelling      Social History     Social History    Marital status: SINGLE     Spouse name: N/A    Number of children: N/A    Years of education: N/A     Occupational History    Not on file. Social History Main Topics    Smoking status: Current Every Day Smoker     Packs/day: 0.25    Smokeless tobacco: Not on file    Alcohol use No    Drug use: Not on file    Sexual activity: Not on file     Other Topics Concern    Not on file     Social History Narrative     Family History   Problem Relation Age of Onset    Heart Failure Father     Coronary Artery Disease Father     Anemia Mother     Diabetes Mother     Hypertension Mother     Cancer Other         No current facility-administered medications for this encounter. Current Outpatient Prescriptions   Medication Sig    losartan-hydroCHLOROthiazide (HYZAAR) 100-25 mg per tablet Take 1 Tab by mouth daily.  esomeprazole (NEXIUM) 40 mg capsule Take  by mouth daily.  linaclotide (LINZESS) 290 mcg cap capsule Take  by mouth Daily (before breakfast). Review of Systems    Review of Systems   Respiratory: Positive for shortness of breath. Cardiovascular: Positive for chest pain and palpitations. Gastrointestinal: Positive for blood in stool. Neurological: Positive for dizziness. All other systems reviewed and are negative. Subjective:     Visit Vitals    /77    Pulse 77    Temp 98.9 °F (37.2 °C)    Resp 20    Ht 5' 8\" (1.727 m)    Wt 156.5 kg (345 lb)    LMP 06/05/2017    SpO2 99%    BMI 52.46 kg/m2     Physical Exam   Constitutional: She is oriented to person, place, and time and well-developed, well-nourished, and in no distress. HENT:   Head: Normocephalic. Eyes: Pupils are equal, round, and reactive to light. Neck: Normal range of motion. Cardiovascular: Normal rate and regular rhythm. Distant heart sounds due to body habitus   Pulmonary/Chest: Effort normal and breath sounds normal.   Distant lung sounds due to body habitus   Abdominal: Soft. Musculoskeletal: Normal range of motion. Neurological: She is alert and oriented to person, place, and time. Skin: Skin is warm. Psychiatric: Affect normal.       Cardiographics  Telemetry: normal sinus rhythm  ECG: normal EKG, normal sinus rhythm with T wave inversion in V1-V3  Echocardiogram: ordered/pending    Labs:   Recent Results (from the past 24 hour(s))   EKG, 12 LEAD, INITIAL    Collection Time: 06/12/17  8:20 PM   Result Value Ref Range    Ventricular Rate 90 BPM    Atrial Rate 90 BPM    P-R Interval 142 ms    QRS Duration 86 ms    Q-T Interval 366 ms    QTC Calculation (Bezet) 447 ms    Calculated P Axis 24 degrees    Calculated R Axis 81 degrees    Calculated T Axis 46 degrees    Diagnosis       Normal sinus rhythm  Nonspecific T wave abnormality  Abnormal ECG  No previous ECGs available     CBC WITH AUTOMATED DIFF    Collection Time: 06/12/17  8:38 PM   Result Value Ref Range    WBC 6.9 4.3 - 11.1 K/uL    RBC 5.10 4.05 - 5.25 M/uL    HGB 14.2 11.7 - 15.4 g/dL    HCT 41.4 35.8 - 46.3 %    MCV 81.2 79.6 - 97.8 FL    MCH 27.8 26.1 - 32.9 PG    MCHC 34.3 31.4 - 35.0 g/dL    RDW 13.5 11.9 - 14.6 %    PLATELET 329 809 - 797 K/uL    MPV 8.9 (L) 10.8 - 14.1 FL    DF AUTOMATED      NEUTROPHILS 47 43 - 78 %    LYMPHOCYTES 44 13 - 44 %    MONOCYTES 6 4.0 - 12.0 %    EOSINOPHILS 3 0.5 - 7.8 %    BASOPHILS 0 0.0 - 2.0 %    IMMATURE GRANULOCYTES 0.1 0.0 - 5.0 %    ABS. NEUTROPHILS 3.2 1.7 - 8.2 K/UL    ABS. LYMPHOCYTES 3.0 0.5 - 4.6 K/UL    ABS. MONOCYTES 0.4 0.1 - 1.3 K/UL    ABS. EOSINOPHILS 0.2 0.0 - 0.8 K/UL    ABS. BASOPHILS 0.0 0.0 - 0.2 K/UL    ABS. IMM.  GRANS. 0.0 0.0 - 0.5 K/UL   METABOLIC PANEL, BASIC    Collection Time: 06/12/17  8:38 PM   Result Value Ref Range    Sodium 138 136 - 145 mmol/L    Potassium 4.7 3.5 - 5.1 mmol/L    Chloride 101 98 - 107 mmol/L    CO2 28 21 - 32 mmol/L    Anion gap 9 7 - 16 mmol/L    Glucose 82 65 - 100 mg/dL    BUN 10 6 - 23 MG/DL    Creatinine 0.90 0.6 - 1.0 MG/DL    GFR est AA >60 >60 ml/min/1.73m2    GFR est non-AA >60 >60 ml/min/1.73m2    Calcium 9.4 8.3 - 10.4 MG/DL   POC TROPONIN-I    Collection Time: 06/12/17  8:47 PM   Result Value Ref Range    Troponin-I (POC) 0.01 0.0 - 0.08 ng/ml       Patient has been seen and examined by Dr. Ida Ferguson and he agrees with the following assessment and plan:     Assessment/Plan:        Chest pain -- admit to telemetry, recent nuclear stress test showing anterior ischemia. Follow serial cardiac enzymes. Give ASA 324mg po now and continue 81mg daily, start low dose lopressor 25mg BID, topical nitrates and IV heparin drip with 4000 unit IV bolus. Monitor for bleeding. NPO after midnight. Consult GI in the AM for recommendations regarding bleeding after taking ASA/NSAIDS. Will need LHC with possible PCI if able to take DAPT. GERD (gastroesophageal reflux disease) -- on PPI every day at home. Will increase to BID for now. GI consult placed for the AM.       Essential hypertension -- continue home medications along with topical nitrates and BB therapy. Monitor BP closely and titrate medications as needed. BMI 50.0-59.9, adult       Palpitations -- reports that she has worn a cardiac monitor as outpatient in the past, reports this as normal. Monitor on telemetry. Start low dose BB therapy with first dose tonight. Tobacco abuse -- importance of cessation discussed an encouraged. Reports that a pack will last her almost a week when not under stress, only 2 days if under stress. Elvis Brown NP  6/12/2017 10:45 PM    ATTENDING ADDENDUM:    Patient seen and examined by me. Agree with above note by physician extender.   Key findings are:  No CP or BURTON at present, but with recurrent symptoms and recent anterior ischemia on nuclear stress, needs repeat LHC with possible PCI. With BRBPR with recent NSAID and ASA use, we will ask GI to see her today and offer recommendations prior to [address]. She will need gallbladder evaluation if cath negative. CV- RRR without murmur  Lungs- Clear bilaterally  Abd- soft, mild RUQ tender, nondistended  Ext- no edema    Plan: As above.     Ivan Alcala MD  [facility] Cardiology  Pager [phone]

## 2017-06-13 NOTE — PROCEDURES
Dinora Whiting 46       Name:  Netta Cedillo   MR#:  [id number]   :  1970   Account #:  [de-identified]   Date of Adm:  2017       DATE OF PROCEDURE: 2017    REFERRING PHYSICIAN: Samantha Harris. Christy Vidal MD.    PRIMARY CARE PHYSICIAN: Daisy Key. Sonia Myers MD, and Aneta Wray MD.    REASON FOR PROCEDURE: This is a 82-year-old obese    American female with a history of tobacco use, acid reflux and   hypertension who presented to the emergency room complaining of   severe recurrent substernal chest pain. Troponins are negative,   but given the patient's body habitus and an abnormal nuclear   stress test in our office recently demonstrating anterior   ischemia, she is referred for definitive catheterization. PROCEDURE PERFORMED: Left heart catheterization with coronary   angiography and left ventriculogram.      TOTAL CONTRAST: 60 mL of Isovue. CONSCIOUS SEDATION: The patient was sedated with 4 mg of Versed   and 75 mcg fentanyl by Clyde Sapp RN and monitored from   2:28 p.m. to 2:50 p.m. PROCEDURE TECHNIQUE: After informed consent was obtained, the   patient was brought to the cath lab and prepped and draped in   the usual fashion. A 6-Bulgarian sheath was placed in the right   radial artery using a micropuncture modified Seldinger   technique. Left heart catheterization performed using standard   5-Bulgarian angled pigtail and Tiger catheters. Manual pressure   will be applied to the patient's access site via TR band   protocol. There were no complications. PRESSURE RESULTS: Left ventricle 128/20. Aorta 128/80. Left ventriculogram reveals normal left ventricular regional   wall motion with ejection fraction greater than 60%. There is no   mitral regurgitation and there is no aortic valve gradient on   catheter pullback. Left ventricular end-diastolic pressure is   elevated.     CORONARY ANATOMY: The left main is large and minimally irregular, dividing into an LAD, circumflex, and ramus   intermedius in the usual fashion. The LAD gives off 2 diagonal branches and then wraps around the   apex. There are at most, minimal luminal irregularities   throughout the entire LAD distribution. The ramus intermedius is a small caliber vessel which has   minimal irregularities in the mid vessel, but otherwise appears   normal.      The circumflex is a moderate caliber, nondominant system which   retroflexes off the left main and then trifurcates. There are   minimal irregularities throughout the left circumflex. The right coronary is a moderate caliber anatomically dominant   vessel supplying a fairly large posterior descending system and   a small posterolateral branch. The right coronary distribution   appears normal.    CONCLUSIONS:   1. Minimal coronary disease at most.   2. Normal left ventricular regional wall motion and ejection   fraction. PLAN: The patient will have a right upper quadrant ultrasound   and a hepatic panel to assess for gallbladder or abdominal   etiology for her symptoms. Medical maximization is warranted and aggressive lifestyle   modification with diet, exercise and weight loss in the   outpatient setting.         Shyann Sequeira MD      ATS / Quentin Miguel   D:  06/13/2017   15:02   T:  06/13/2017   15:23   Job #:  [id number]

## 2017-06-13 NOTE — PROGRESS NOTES
Radial compression band removed at 1800 after slowly reducing air from 12 cc to zero as per hospital protocol. No bleeding or hematoma noted. 2 x 2 gauze with tegaderm placed over puncture site. The affected extremity is warm and dry to the touch. Frequent vital signs documented per flowheet. Patient instructed to call if any bleeding noted on gauze. Patient verbalized understanding the nursing instructions.

## 2017-06-13 NOTE — PROGRESS NOTES
Verbal bedside report given to oncoming RN, Sariah Boards. Patient's situation, background, assessment and recommendations provided. Opportunity for questions provided. Oncoming RN assumed care of patient.

## 2017-06-13 NOTE — PROGRESS NOTES
TRANSFER - IN REPORT:    Verbal report received from Radha Grover [facility] on Bernrosace [place] being received from [address] [place] [address] for routine progression of care      Report consisted of patients Situation, Background, Assessment and Recommendations(SBAR). Information from the following report(s) SBAR, Kardex, [facility] and Recent Results was reviewed with the receiving nurse. Opportunity for questions and clarification was provided. Assessment completed upon patients arrival to unit and care assumed.    R Radial site visualized - TR band in place, VS cycling post cath lab procedure

## 2017-06-13 NOTE — PROGRESS NOTES
Per Dr. Alicia Scruggs with [facility] - ok for patient to have cath today, Jaret Kaufman Cath Lab RN notified

## 2017-06-13 NOTE — PROGRESS NOTES
Patient asked this nurse if a mental health professional was available. When encouraged to share how this nurse could help her, patient states \"I just need help, I can't do this anymore. \" This nurse asked if the patient was having any suicidal ideations. Patient states \"no. \" Patient continues to deny suicidal ideation or a plan. [place] services offered but patient declines at this time. Patient states she would speak to a .  consult placed. Will continue to monitor closely.

## 2017-06-13 NOTE — ROUTINE PROCESS
TRANSFER - OUT REPORT:    Verbal report given to Melissa Campos RN (name) on Baudilio Bronson  being transferred to [address] [place] [address] ([facility]) for routine progression of care       Report consisted of patients Situation, Background, Assessment and   Recommendations(SBAR). Information from the following report(s) Procedure Summary, MAR and Recent Results was reviewed with the receiving nurse. Lines:   Peripheral IV 06/12/17 Left Antecubital (Active)   Site Assessment Clean, dry, & intact 6/13/2017  7:32 AM   Phlebitis Assessment 0 6/13/2017  7:32 AM   Infiltration Assessment 0 6/13/2017  7:32 AM   Dressing Status Clean, dry, & intact 6/13/2017  7:32 AM   Dressing Type Tape;Transparent 6/13/2017  7:32 AM   Hub Color/Line Status Infusing 6/13/2017  7:32 AM        Opportunity for questions and clarification was provided.       Patient transported [facility]:   [facility]  R band to right radial w/ 12 mls at 1445  Versed 4 mg IV  Fentanyl 75 mcg IV

## 2017-06-13 NOTE — PROGRESS NOTES
TRANSFER - OUT REPORT:    Verbal report given to Prisca Palomo RN (name) on Johanny Aguirre  being transferred to [facility] (unit) for ordered procedure       Report consisted of patients Situation, Background, Assessment and   Recommendations(SBAR). Information from the following report(s) SBAR, Kardex, [facility] and Recent Results was reviewed with the receiving nurse. Lines:   Peripheral IV 06/12/17 Left Antecubital (Active)   Site Assessment Clean, dry, & intact 6/13/2017  7:32 AM   Phlebitis Assessment 0 6/13/2017  7:32 AM   Infiltration Assessment 0 6/13/2017  7:32 AM   Dressing Status Clean, dry, & intact 6/13/2017  7:32 AM   Dressing Type Tape;Transparent 6/13/2017  7:32 AM   Hub Color/Line Status Infusing 6/13/2017  7:32 AM        Opportunity for questions and clarification was provided.       Patient transported with:   Monitor  Registered Nurse

## 2017-06-13 NOTE — PROGRESS NOTES
TRANSFER - IN REPORT:    Verbal report received from Rachel Lara RN on Clarissa Car  being received from ED for routine progression of care      Report consisted of patients Situation, Background, Assessment and   Recommendations(SBAR). Information from the following reports was received: Kardex, ED Summary, MAR and Recent Results. Opportunity for questions and clarification was provided. Patient received to room 325. Patient connected to monitor and assessment completed. Plan of care reviewed. Patient oriented to room and call light. Patient aware to use call light to communicate any chest pain or needs.      Admission skin assessment completed with second RN and reveals the following: no redness or breakdown noted

## 2017-06-14 ENCOUNTER — APPOINTMENT (OUTPATIENT)
Dept: ULTRASOUND IMAGING | Age: 47
DRG: 287 | End: 2017-06-14
Attending: INTERNAL MEDICINE
Payer: MEDICARE

## 2017-06-14 ENCOUNTER — APPOINTMENT (OUTPATIENT)
Dept: CT IMAGING | Age: 47
DRG: 287 | End: 2017-06-14
Attending: INTERNAL MEDICINE
Payer: MEDICARE

## 2017-06-14 VITALS
HEIGHT: 68 IN | HEART RATE: 68 BPM | WEIGHT: 293 LBS | RESPIRATION RATE: 20 BRPM | TEMPERATURE: 98.4 F | DIASTOLIC BLOOD PRESSURE: 72 MMHG | BODY MASS INDEX: 44.41 KG/M2 | SYSTOLIC BLOOD PRESSURE: 119 MMHG | OXYGEN SATURATION: 99 %

## 2017-06-14 PROBLEM — R07.89 NON-CARDIAC CHEST PAIN: Status: ACTIVE | Noted: 2017-06-14

## 2017-06-14 LAB
ANION GAP BLD CALC-SCNC: 10 MMOL/L (ref 7–16)
BUN SERPL-MCNC: 12 MG/DL (ref 6–23)
CALCIUM SERPL-MCNC: 8.8 MG/DL (ref 8.3–10.4)
CHLORIDE SERPL-SCNC: 102 MMOL/L (ref 98–107)
CO2 SERPL-SCNC: 26 MMOL/L (ref 21–32)
CREAT SERPL-MCNC: 0.78 MG/DL (ref 0.6–1)
GLUCOSE SERPL-MCNC: 111 MG/DL (ref 65–100)
POTASSIUM SERPL-SCNC: 3.5 MMOL/L (ref 3.5–5.1)
SODIUM SERPL-SCNC: 138 MMOL/L (ref 136–145)

## 2017-06-14 PROCEDURE — 74011250637 HC RX REV CODE- 250/637: Performed by: NURSE PRACTITIONER

## 2017-06-14 PROCEDURE — 80048 BASIC METABOLIC PNL TOTAL CA: CPT | Performed by: INTERNAL MEDICINE

## 2017-06-14 PROCEDURE — 36415 COLL VENOUS BLD VENIPUNCTURE: CPT | Performed by: INTERNAL MEDICINE

## 2017-06-14 PROCEDURE — 74011000258 HC RX REV CODE- 258: Performed by: INTERNAL MEDICINE

## 2017-06-14 PROCEDURE — 71260 CT THORAX DX C+: CPT

## 2017-06-14 PROCEDURE — 74011636320 HC RX REV CODE- 636/320: Performed by: INTERNAL MEDICINE

## 2017-06-14 PROCEDURE — 74011250637 HC RX REV CODE- 250/637: Performed by: INTERNAL MEDICINE

## 2017-06-14 PROCEDURE — 76705 ECHO EXAM OF ABDOMEN: CPT

## 2017-06-14 RX ORDER — SODIUM CHLORIDE 0.9 % (FLUSH) 0.9 %
10 SYRINGE (ML) INJECTION
Status: COMPLETED | OUTPATIENT
Start: 2017-06-14 | End: 2017-06-14

## 2017-06-14 RX ORDER — GUAIFENESIN 100 MG/5ML
81 LIQUID (ML) ORAL DAILY
Status: SHIPPED | COMMUNITY
Start: 2017-06-14 | End: 2017-10-04

## 2017-06-14 RX ORDER — FLUOXETINE 10 MG/1
10 CAPSULE ORAL EVERY 8 HOURS
Qty: 90 CAP | Refills: 0 | Status: SHIPPED | OUTPATIENT
Start: 2017-06-14 | End: 2017-10-04 | Stop reason: ALTCHOICE

## 2017-06-14 RX ORDER — OLANZAPINE 2.5 MG/1
1.25 TABLET ORAL EVERY 8 HOURS
Qty: 45 TAB | Refills: 0 | Status: SHIPPED | OUTPATIENT
Start: 2017-06-14 | End: 2017-10-04 | Stop reason: ALTCHOICE

## 2017-06-14 RX ORDER — HYDROCORTISONE ACETATE 25 MG/1
25 SUPPOSITORY RECTAL DAILY
Qty: 13 SUPPOSITORY | Refills: 0 | Status: SHIPPED | OUTPATIENT
Start: 2017-06-14 | End: 2017-06-27

## 2017-06-14 RX ADMIN — IOPAMIDOL 100 ML: 755 INJECTION, SOLUTION INTRAVENOUS at 12:24

## 2017-06-14 RX ADMIN — HYDROCHLOROTHIAZIDE: 25 TABLET ORAL at 09:09

## 2017-06-14 RX ADMIN — Medication 5 ML: at 06:02

## 2017-06-14 RX ADMIN — Medication 10 ML: at 12:25

## 2017-06-14 RX ADMIN — ASPIRIN 81 MG 81 MG: 81 TABLET ORAL at 09:09

## 2017-06-14 RX ADMIN — METOPROLOL TARTRATE 25 MG: 25 TABLET ORAL at 09:09

## 2017-06-14 RX ADMIN — PANTOPRAZOLE SODIUM 40 MG: 40 TABLET, DELAYED RELEASE ORAL at 09:09

## 2017-06-14 RX ADMIN — HYDROCORTISONE ACETATE 25 MG: 25 SUPPOSITORY RECTAL at 09:10

## 2017-06-14 RX ADMIN — POLYETHYLENE GLYCOL 3350 34 G: 17 POWDER, FOR SOLUTION ORAL at 09:10

## 2017-06-14 RX ADMIN — SODIUM CHLORIDE 100 ML: 900 INJECTION, SOLUTION INTRAVENOUS at 12:25

## 2017-06-14 NOTE — DISCHARGE SUMMARY
[facility] Cardiology Discharge Summary     Patient ID:  Robert Griggs  [id number]  4 y.o.  1970    Admit date: 6/12/2017    Discharge date:  6/14/2017    Admitting Physician: Cheng Holman DO     Discharge Physician: RANJAN Slade/Dr. Dominguez Sorto    Admission Diagnoses: Chest pain    Discharge Diagnoses:    Diagnosis    Palpitations    Non cardiac chest pain     Obesity    Constipation, unspecified    GERD (gastroesophageal reflux disease)    Essential hypertension    BMI 50.0-59.9, adult (HCC)    Major depressive disorder with single episode    Insomnia due to medical condition       Cardiology Procedures this admission:  Diagnostic left heart catheterization, echo, CTA   Consults: GI    Hospital Course: Patient presented to the emergency department of [facility] with complaints of chest pain which radiated to between her shoulder blades, with SOB and palpitations. Patient was evaluated and subsequently admitted for further cardiac evaluation and treatment. Cardiac enzymes were negative. She had rectal bleeding and GI was consulted prior to cath being performed. Bleeding was felt to be hemorrhoidal and she was started on ansuol, outpt colonoscopy recommended. After clearance by GI a [facility] was planned for 6-13-17. Patient underwent cardiac catheterization by Dr. Dominguez Sorto which showed minimal CAD w nml EF. Patient tolerated the procedure well and returned to the telemetry floor for recovery. An echocardiogram was performed with report as follows:     -  Left ventricle: Systolic function was normal. Ejection fraction was  estimated in the range of 55 % to 60 %. There were no regional wall motion  abnormalities. -  Pericardium: There was no pericardial effusion. CTA was performed which showed    The morning of 6/14/2016 patient was up feeling well without any complaints of chest pain or shortness of breath.  Plans were made to discharge the patient but when she was seen by social work she reported that she was depressed and suicidal.  She was seen by tele psych and noted to have bipolar affect, not to be a risk to herself or others, and safe for discharge. F/U with [facility] [place] was offered to the pt but she declined. She was started on zyprexa and prozac as per tele psych recommendations though she refused these medications. Patient's right radial cath site was clean, dry and intact without hematoma or bruit. Patient's labs were WNL. Patient was seen and examined by Dr. Jake Burris and determined stable and ready for discharge. The patient will follow [facility] Thursday at 10:30 [facility] office and f/u with Dr Linda Caro  Thursday July 13 at 1400 [facility] office. She is instructed to return to the ER is worsening depression symptoms. DISPOSITION: The patient is being discharged home in stable condition on a low saturated fat, low cholesterol and low salt diet. The patient is instructed to advance activities as tolerated to the limit of fatigue or shortness of breath. The patient is instructed to avoid all heavy lifting for 5 days. The patient is instructed to watch the cath site for bleeding/oozing; if seen, the patient is instructed to apply firm pressure with a clean cloth and call [facility] Cardiology at [phone]. The patient is instructed to watch for signs of infection which include: increasing area of redness, fever/hot to touch or purulent drainage at the catheterization site. The patient is instructed not to soak in a bathtub for 7-10 days, but is cleared to shower. The patient is instructed to call the office or return to the ER for immediate evaluation for any shortness of breath or chest pain not relieved by NTG.       Discharge Exam:   Visit Vitals    /65    Pulse 73    Temp 97.9 °F (36.6 °C)    Resp 18    Ht 5' 8\" (1.727 m)    Wt 154.4 kg (340 lb 6.4 oz)    LMP 06/05/2017    SpO2 99%    BMI 51.76 kg/m2     Patient has been seen by Dr. Ingris Smith: see his progress note for exam details. No results found for this or any previous visit (from the past 24 hour(s)). Patient Instructions:   Current Discharge Medication List      START taking these medications    Details   aspirin 81 mg chewable tablet Take 1 Tab by mouth daily. hydrocortisone (ANUSOL-HC) 25 mg supp Insert 1 Suppository into rectum daily for 13 doses. Qty: 13 Suppository, Refills: 0      OLANZapine (ZYPREXA) 2.5 mg tablet Take 0.5 Tabs by mouth every eight (8) hours. Qty: 45 Tab, Refills: 0      FLUoxetine (PROZAC) 10 mg capsule Take 1 Cap by mouth every eight (8) hours. Qty: 90 Cap, Refills: 0         CONTINUE these medications which have NOT CHANGED    Details   losartan-hydroCHLOROthiazide (HYZAAR) 100-25 mg per tablet Take 1 Tab by mouth daily. esomeprazole (NEXIUM) 40 mg capsule Take  by mouth daily. linaclotide (LINZESS) 290 mcg cap capsule Take  by mouth Daily (before breakfast). Signed:  Lillette Meigs, PA-C  6/14/2017  7:43 AM    ATTENDING ADDENDUM:    Patient seen and examined by me. Agree with above note by physician extender. Key findings are:  No CP or BURTON, CATH NORMAL AND CTA CHEST NORMAL. SEEN BY TELE-PSYCH WHO DOES NOT THINK SHE IS SUICIDAL OR A DANGER TO HERSELF, JUST DEPRESSED. HE SUGGESTED TAKING LOW DOSE PROZAC AND ZYPREXA. I OFFERED THESE TO HER BUT SHE REFUSED STATING SHE JUST WANTED TO BE SEEN BY [facility] AGAIN. WE GAVE SCRIPTS FOR THE MEDS AND ENCOURAGED HER TO KEEP FOLLOW UP WITH [facility] SOON. CV- RRR without murmur  Lungs- Clear bilaterally  Abd- soft, nontender, nondistended  Ext- no edema    Plan: As above.     Jayesh Hartman MD  [facility] Cardiology  Pager [phone]

## 2017-06-14 NOTE — PROGRESS NOTES
Dr. Purcell Bumpers has viewed recommendations by Ata Cowan MD (tele psych). No new orders were given. Paper copy of recommendations are on hard chart.

## 2017-06-14 NOTE — PROGRESS NOTES
Patient seated on the edge of her bed and turned away from the door when I entered the room  Patient stated that she was sad and depressed but \"good\" - clarifying that she did not want to talk  Patient repeated that she was sad and \"good\" and that her [de-identified] year old daughter with whom she lives was also sad  Patient did not wish to discuss why she was sad but stated that \"God left her a long time ago\"    Aracelis Lundberg III, PhD  [facility]  [phone]

## 2017-06-14 NOTE — PROGRESS NOTES
[facility] CARDIOLOGY PROGRESS NOTE    6/14/2017 7:37 AM    Admit Date: 6/12/2017    Admit Diagnosis: Chest pain      Subjective:   Stable since last night without interval angina, CHF, palpitations, edema, presyncope or syncope. Vitals controlled and tolerating meds well. Objective:      Vitals:    06/13/17 2123 06/14/17 0137 06/14/17 0225 06/14/17 0526   BP: 114/52 100/47  118/65   Pulse: 68 68  73   Resp: 18 18  18   Temp: 97.8 °F (36.6 °C) 97.8 °F (36.6 °C)  97.9 °F (36.6 °C)   SpO2: 100% 99%  99%   Weight:   154.4 kg (340 lb 6.4 oz)    Height:           Physical Exam:  Neck- supple, no JVD  CV- regular rate and rhythm no MRG  Lung- clear bilaterally  Abd- soft, nontender, nondistended  Ext- no edema  Skin- warm and dry    Data Review:   Recent Labs      06/13/17   0441  06/12/17 2038   NA  139  138   K  3.9  4.7   BUN  11  10   CREA  0.74  0.90   GLU  89  82   WBC  6.1  6.9   HGB  12.8  14.2   HCT  37.9  41.4   PLT  409  437   CHOL  174   --    TRIGL  64   --    HDL  70*   --        Assessment and Plan:     Principal Problem:    Chest pain (5/15/2017)- Normal cath yesterday- check CTA chest today- home on PPI's if CTA negative    Active Problems:    GERD (gastroesophageal reflux disease) (5/8/2017)- stable, continue PPI BID for now      Essential hypertension (5/8/2017)- well controlled, continue meds      BMI 50.0-59.9, adult ([address] (5/8/2017)- diet/exercise/weight loss encouraged      Palpitations (5/15/2017)- tele, home with [facility]    Home later today if CTA negative and gallbladder eval negative.          Jayesh Hartman MD  [facility] Cardiology  Pager [phone]

## 2017-06-14 NOTE — PROGRESS NOTES
Abd. US in AM. Teaching/ instructions regarding NPO. Nothing to eat or drink after MN. Patient verbalized understanding. No further questions, request or complaints when asked. NPO sign posted on door frame, next to room number.

## 2017-06-14 NOTE — PROGRESS NOTES
Patient refused nurses attempts to review discharge instructions. Patient took discharge packet from the nurse and walked out of room on her own to elevators.

## 2017-06-14 NOTE — PROGRESS NOTES
Bedside and Verbal shift change report given to Migdalia Whitfield RN (oncoming nurse) by self Melonie Arm nurse). Report included the following information Kardex, Intake/Output, MAR, Recent Results and Cardiac Rhythm NSR.

## 2017-06-14 NOTE — PROGRESS NOTES
FAB dc screening. Pt requested to see this writer. Very reticent to engage in a discussion. Flat affect. Did state, however, that she has been experiencing thoughts of harming herself for months. She verbalizes no intention or plan. Stated that she sought treatment at [facility] - no timeframe given. \"They didn't do nothing for me. \"  When asked about her living situation, states she lives with her mother and daughter. \"I'm tired of dealing with that situation, too. \"  When asked to clarify, pt did not respond. Pt is aware that she will be evaluated by telepsych and is agreeable to same. Stated she had called [address] yesterday inquiring about the process for admission. Awaiting Telepsych eval and recommendations.

## 2017-06-14 NOTE — PROGRESS NOTES
Seen by Telepsych. Pt evaluated as\" not currently exhibiting dangerousness to self/others. She is not psychotic, cognitively impaired and not actively suicidal .\"   Medication suggestions made. SW met with pt and offered to make an assessment appt for her at [facility]. Pt refused the offer stating, \"I will make my own assessment appt and arrange my own transportation. \"

## 2017-06-14 NOTE — DISCHARGE INSTRUCTIONS
Angina: Care Instructions  Your Care Instructions    You have a problem called angina. Angina happens when there is not enough blood flow to your heart muscle. Angina is a sign of coronary artery disease (CAD). CAD occurs when blood vessels that supply the heart become narrowed. Having CAD increases your risk of a heart attack. Chest pain or pressure is the most common symptom of angina. But some people have other symptoms, like:  · Pain, pressure, or a strange feeling in the back, neck, jaw, or upper belly, or in one or both shoulders or arms. · Shortness of breath. · Nausea or vomiting. · Lightheadedness or sudden weakness. · Fast or irregular heartbeat. Women are somewhat more likely than men to have angina symptoms like shortness of breath, nausea, and back or jaw pain. Angina can be dangerous. That's why it is important to pay attention to your symptoms. Know what is typical for you, learn how to control your symptoms, and understand when you need to get treatment. A change in your usual pattern of symptoms is an emergency. It may mean that you are having a heart attack. The doctor has checked you carefully, but problems can develop later. If you notice any problems or new symptoms, get medical treatment right away. Follow-up care is a key part of your treatment and safety. Be sure to make and go to all appointments, and call your doctor if you are having problems. It's also a good idea to know your test results and keep a list of the medicines you take. How can you care for yourself at home? Medicines  · If your doctor has given you nitroglycerin for angina symptoms, keep it with you at all times. If you have symptoms, sit down and rest, and take the first dose of nitroglycerin as directed. If your symptoms get worse or are not getting better within 5 minutes, call 911 right away. Stay on the phone. The emergency  will give you further instructions.   · If your doctor advises it, take 1 low-dose aspirin a day to prevent heart attack. · Be safe with medicines. Take your medicines exactly as prescribed. Call your doctor if you think you are having a problem with your medicine. You will get more details on the specific medicines your doctor prescribes. Lifestyle changes  · Do not smoke. If you need help quitting, talk to your doctor about stop-smoking programs and medicines. These can increase your chances of quitting for good. · Eat a heart-healthy diet that is low in saturated fat and salt, and is high in fiber. Talk to your doctor or a dietitian about healthy eating. · Stay at a healthy weight. Or lose weight if you need to. Activity  · Talk to your doctor about a level of activity that is safe for you. · If an activity causes angina symptoms, stop and rest.  When should you call for help? Call 911 anytime you think you may need emergency care. For example, call if:  · You passed out (lost consciousness). · You have symptoms of a heart attack. These may include:  ¨ Chest pain or pressure, or a strange feeling in the chest.  ¨ Sweating. ¨ Shortness of breath. ¨ Nausea or vomiting. ¨ Pain, pressure, or a strange feeling in the back, neck, jaw, or upper belly or in one or both shoulders or arms. ¨ Lightheadedness or sudden weakness. ¨ A fast or irregular heartbeat. After you call 911, the  may tell you to chew 1 adult-strength or 2 to 4 low-dose aspirin. Wait for an ambulance. Do not try to drive yourself. · You have angina symptoms that do not go away with rest or are not getting better within 5 minutes after you take a dose of nitroglycerin. Call your doctor now or seek immediate medical care if:  · You are having angina symptoms more often than usual, or they are different or worse than usual.  · You feel dizzy or lightheaded, or you feel like you may faint. Watch closely for changes in your health, and be sure to contact your doctor if you have any problems.   Where can you learn more? Go to http://harsh-jimmie.info/. Enter H129 in the search box to learn more about \"Angina: Care Instructions. \"  Current as of: January 27, 2016  Content Version: 11.2  © 0411-3012 [employer]. Care instructions adapted under license by [employer] (which disclaims liability or warranty for this information). If you have questions about a medical condition or this instruction, always ask your healthcare professional. [address] any warranty or liability for your use of this information.

## 2017-11-01 ENCOUNTER — ANESTHESIA EVENT (OUTPATIENT)
Dept: ENDOSCOPY | Age: 47
End: 2017-11-01
Payer: MEDICARE

## 2017-11-01 RX ORDER — SODIUM CHLORIDE, SODIUM LACTATE, POTASSIUM CHLORIDE, CALCIUM CHLORIDE 600; 310; 30; 20 MG/100ML; MG/100ML; MG/100ML; MG/100ML
100 INJECTION, SOLUTION INTRAVENOUS CONTINUOUS
Status: CANCELLED | OUTPATIENT
Start: 2017-11-01

## 2017-11-02 ENCOUNTER — HOSPITAL ENCOUNTER (OUTPATIENT)
Age: 47
Setting detail: OUTPATIENT SURGERY
Discharge: HOME | End: 2017-11-02
Attending: INTERNAL MEDICINE | Admitting: INTERNAL MEDICINE
Payer: MEDICARE

## 2017-11-02 ENCOUNTER — ANESTHESIA (OUTPATIENT)
Dept: ENDOSCOPY | Age: 47
End: 2017-11-02
Payer: MEDICARE

## 2017-11-02 VITALS
RESPIRATION RATE: 12 BRPM | WEIGHT: 293 LBS | HEIGHT: 68 IN | SYSTOLIC BLOOD PRESSURE: 139 MMHG | DIASTOLIC BLOOD PRESSURE: 83 MMHG | OXYGEN SATURATION: 100 % | TEMPERATURE: 98.6 F | HEART RATE: 78 BPM | BODY MASS INDEX: 44.41 KG/M2

## 2017-11-02 PROCEDURE — 74011250636 HC RX REV CODE- 250/636

## 2017-11-02 PROCEDURE — 74011000250 HC RX REV CODE- 250

## 2017-11-02 PROCEDURE — 76040000026: Performed by: INTERNAL MEDICINE

## 2017-11-02 PROCEDURE — 76060000032 HC ANESTHESIA 0.5 TO 1 HR: Performed by: INTERNAL MEDICINE

## 2017-11-02 RX ORDER — PROPOFOL 10 MG/ML
INJECTION, EMULSION INTRAVENOUS
Status: DISCONTINUED | OUTPATIENT
Start: 2017-11-02 | End: 2017-11-02 | Stop reason: HOSPADM

## 2017-11-02 RX ORDER — SODIUM CHLORIDE, SODIUM LACTATE, POTASSIUM CHLORIDE, CALCIUM CHLORIDE 600; 310; 30; 20 MG/100ML; MG/100ML; MG/100ML; MG/100ML
INJECTION, SOLUTION INTRAVENOUS
Status: DISCONTINUED | OUTPATIENT
Start: 2017-11-02 | End: 2017-11-02 | Stop reason: HOSPADM

## 2017-11-02 RX ORDER — LIDOCAINE HYDROCHLORIDE 20 MG/ML
INJECTION, SOLUTION EPIDURAL; INFILTRATION; INTRACAUDAL; PERINEURAL AS NEEDED
Status: DISCONTINUED | OUTPATIENT
Start: 2017-11-02 | End: 2017-11-02 | Stop reason: HOSPADM

## 2017-11-02 RX ORDER — PROPOFOL 10 MG/ML
INJECTION, EMULSION INTRAVENOUS
Status: DISCONTINUED
Start: 2017-11-02 | End: 2017-11-02 | Stop reason: HOSPADM

## 2017-11-02 RX ORDER — PROPOFOL 10 MG/ML
INJECTION, EMULSION INTRAVENOUS AS NEEDED
Status: DISCONTINUED | OUTPATIENT
Start: 2017-11-02 | End: 2017-11-02 | Stop reason: HOSPADM

## 2017-11-02 RX ADMIN — PROPOFOL 50 MG: 10 INJECTION, EMULSION INTRAVENOUS at 14:16

## 2017-11-02 RX ADMIN — SODIUM CHLORIDE, SODIUM LACTATE, POTASSIUM CHLORIDE, CALCIUM CHLORIDE: 600; 310; 30; 20 INJECTION, SOLUTION INTRAVENOUS at 14:03

## 2017-11-02 RX ADMIN — PROPOFOL 50 MG: 10 INJECTION, EMULSION INTRAVENOUS at 14:09

## 2017-11-02 RX ADMIN — LIDOCAINE HYDROCHLORIDE 40 MG: 20 INJECTION, SOLUTION EPIDURAL; INFILTRATION; INTRACAUDAL; PERINEURAL at 14:09

## 2017-11-02 RX ADMIN — PROPOFOL 50 MG: 10 INJECTION, EMULSION INTRAVENOUS at 14:12

## 2017-11-02 RX ADMIN — PROPOFOL 180 MCG/KG/MIN: 10 INJECTION, EMULSION INTRAVENOUS at 14:09

## 2017-11-02 NOTE — ROUTINE PROCESS
Patient discharged. Passing flatus. Tolerating po fluids. No acute distress noted. Escorted to [place], with family by Gold Abreu RN .

## 2017-11-02 NOTE — H&P
[facility] H and P            Chief Complaint:  Rectal bleeding, chest pain     Subjective:     History of Present Illness:  Patient is a 42 y. o. woman who presents for EGD and Colonoscopy as an outpatient. No acute issues     PMH:  Past Medical History:   Diagnosis Date    Acid reflux     Adverse effect of anesthesia     family says she may have JAMES-had study 2015-see above    Arthritis     R hip    Hypertension     Hypoglycemia     Ill-defined condition     CP, SOB at rest- referred by PCP to [facility]- had cath 6/13/17- HR was fast so she started metoprolol and is some better    Left leg pain     Low back pain     Morbid obesity ([facility] [address]     BMI- 54 10/31/17- had sleep study but did not FU- family says they have oserved this    Psychiatric disorder 10/31/2017    \"I used to see things\"- ran out of RX of Haldol begining of Oct -OK       PSH:  Past Surgical History:   Procedure Laterality Date    HX HEART CATHETERIZATION  06/14/2017    OK- had CP- was started on metoprolol Oct 2017-HR better but still has CP    HX ORTHOPAEDIC Right 2006    knee        Allergies: Allergies   Allergen Reactions    Latex Hives     Denies anaphylactic    Augmentin [Amoxicillin-Pot Clavulanate] Swelling     Swelling lips with high dosage       Home Medications:  Prior to Admission medications    Medication Sig Start Date End Date Taking? Authorizing Provider   metoprolol succinate (TOPROL-XL) 100 mg tablet Take 1 Tab by mouth daily. Patient taking differently: Take 100 mg by mouth every morning. 10/4/17  Yes Rodger Anguiano MD   esomeprazole (NEXIUM) 40 mg capsule Take  by mouth every morning. Yes Historical Provider   linaclotide [facility]) 290 mcg cap capsule Take  by mouth Daily (before breakfast). Yes Historical Provider       Hospital Medications:  No current facility-administered medications for this encounter.         Social History:  Social History   Substance Use Topics    Smoking status: Current Every Day Smoker     Packs/day: 0.25     Years: 21.00    Smokeless tobacco: Never Used    Alcohol use No     Pt denies any history of IV drug use, blood transfusions, tattoos     Family History:  Family History   Problem Relation Age of Onset    Heart Failure Father     Coronary Artery Disease Father     Anemia Mother     Diabetes Mother      insulin dep    Hypertension Mother     Heart Disease Mother      CHF    Stroke Mother 58     X 2    Cancer Other     Stroke Sister     Hypertension Sister     Other Sister      hypoglycemic    Cancer Sister      stomach CA    Diabetes Brother      borderline    Hypertension Brother     Cancer Sister      rectal    Other Sister      hypoglycemic    Hypertension Sister     Diabetes Sister      borderline    Hypertension Sister     Other Sister      hypogly    Eczema Sister     Other Sister      hypogly    Asthma Sister    [place] Arthritis-osteo Sister     Heart Disease Sister 39     MI- CABG X 2    Hypertension Sister     Diabetes Sister      borderline    Psychiatric Disorder Sister      bad nerves    Arthritis-osteo Brother        Review of Systems:  A detailed 10 system ROS is obtained, with pertinent positives as listed above. All others are negative. Diet:      Objective:     Physical Exam:  Vitals:  Visit Vitals    /70    Pulse 82    Temp 98.5 °F (36.9 °C)    Resp 16    Ht 5' 8\" (1.727 m)    Wt (!) 161 kg (355 lb)    LMP 10/21/2017  Comment: signed refusal    SpO2 98%    BMI 53.98 kg/m2     Gen:  Pt is alert, cooperative, no acute distress  Skin:  Extremities and face reveal no rashes. No fu erythema. No telangiectasias on the chest wall. HEENT: Sclerae anicteric. Extra-occular muscles are intact. No oral ulcers. No abnormal pigmentation of the lips. The neck is supple. Cardiovascular: Regular rate and rhythm. No murmurs, gallops, or rubs. Respiratory:  Comfortable breathing with no accessory muscle use. Clear breath sounds anteriorly with no wheezes, rales, or rhonchi. GI:  Abdomen nondistended, soft, and nontender. Normal active bowel sounds. No enlargement of the liver or spleen. No masses palpable. Rectal:  Deferred  Musculoskeletal:  No pitting edema of the lower legs. Extremities have good range of motion. No costovertebral tenderness. Neurological:  Gross memory appears intact. Patient is alert and oriented. Psychiatric:  Mood appears appropriate with judgement intact. Lymphatic:  No cervical or supraclavicular adenopathy. Laboratory:      Assessment:       Active Problems:    * No active hospital problems. *      Plan:       EGD and Colonoscopy as planned.  ASA III, MP III

## 2017-11-02 NOTE — ANESTHESIA POSTPROCEDURE EVALUATION
Post-Anesthesia Evaluation and Assessment    Patient: Tico Dailey MRN: [id number]  SSN: [id number]    YOB: 1970  Age: 42 y.o. Sex: female       Cardiovascular Function/Vital Signs  Visit Vitals    /66    Pulse 81    Temp 37 °C (98.6 °F)    Resp 14    Ht 5' 8\" (1.727 m)    Wt (!) 161 kg (355 lb)    SpO2 100%    BMI 53.98 kg/m2       Patient is status post total IV anesthesia anesthesia for Procedure(s):  ESOPHAGOGASTRODUODENOSCOPY (EGD)  COLONOSCOPY/ 34  ESOPHAGEAL DILATION. Nausea/Vomiting: None    Postoperative hydration reviewed and adequate. Pain:  Pain Scale 1: Numeric (0 - 10) (11/02/17 1502)  Pain Intensity 1: 0 (11/02/17 1502)   Managed    Neurological Status: At baseline    Mental Status and Level of Consciousness: Arousable    Pulmonary Status:   O2 Device: Room air (11/02/17 1502)   Adequate oxygenation and airway patent    Complications related to anesthesia: None    Post-anesthesia assessment completed.  No concerns    Signed By: Lee Arnett MD     November 2, 2017

## 2017-11-02 NOTE — ANESTHESIA PREPROCEDURE EVALUATION
Anesthetic History   No history of anesthetic complications            Review of Systems / Medical History  Patient summary reviewed and pertinent labs reviewed    Pulmonary        Sleep apnea: No treatment           Neuro/Psych   Within defined limits           Cardiovascular    Hypertension: well controlled              Exercise tolerance: >4 METS     GI/Hepatic/Renal     GERD: well controlled           Endo/Other        Morbid obesity     Other Findings              Physical Exam    Airway  Mallampati: II  TM Distance: 4 - 6 cm  Neck ROM: normal range of motion   Mouth opening: Normal     Cardiovascular  Regular rate and rhythm,  S1 and S2 normal,  no murmur, click, rub, or gallop             Dental    Dentition: Upper partial plate and Lower partial plate     Pulmonary  Breath sounds clear to auscultation               Abdominal  GI exam deferred       Other Findings            Anesthetic Plan    ASA: 3  Anesthesia type: total IV anesthesia          Induction: Intravenous  Anesthetic plan and risks discussed with: Patient and Son / Daughter

## 2017-11-02 NOTE — DISCHARGE INSTRUCTIONS
Gastrointestinal Esophagogastroduodenoscopy (EGD) - Upper Exam Discharge Instructions    1. Call Dr. Edward Cancino at [phone] for any problems or questions. 2. Contact the doctor's office for follow up appointment as directed. 3. Medication may cause drowsiness for several hours, therefore, do not drive or operate machinery for remainder of the day. 4. No alcohol today. 5. Ordinarily, you may resume regular diet and activity after exam unless otherwise specified by your physician. 6. For mild soreness in your throat you may use Cepacol throat lozenges or warm salt-water gargles as needed. Any additional instructions: Follow up with family doctor     Gastrointestinal Colonoscopy/Flexible Sigmoidoscopy - Lower Exam Discharge Instructions    1. Ordinarily, you may resume regular diet and activity after exam unless otherwise specified by your physician. 2. Because of air put into your colon during exam, you may experience some abdominal distension, relieved by the passage of gas, for several hours. 3. Contact your physician if you have any of the following:  a. Excessive amount of bleeding - large amount when having a bowel movement. Occasional specks of blood with bowel movement would not be unusual.  b. Severe abdominal pain  c. Fever or Chills  Any additional instructions:  Treat constipation, repeat colonoscopy at age 50      Instructions given to Trey Tinajero and other family members.   Instructions given by:

## 2017-11-02 NOTE — OP NOTES
Endoscopic Gastroduodenoscopy Procedure Note    Indications: chest pain     Anesthesia/Sedation: MAC IV          Procedure Details     Informed consent was obtained for the procedure, including conscious sedation. Risks of infection, perforation, hemorrhage, adverse drug reaction and aspiration were discussed. The patient was placed in the left lateral decubitus position. She was monitored continuously with ECG tracing, pulse oximetry, blood pressure monitoring, and direct observation. The [id number] gastroscope was inserted into the mouth and advanced under direct vision to the second portion of the duodenum. A careful inspection was made as the gastroscope was withdrawn, including a retroflexed view of the proximal stomach; findings and interventions are described below. Appropriate photodocumentation was obtained. Findings: The esophagus appeared normal. The gastric mucosa appeared normal, no hiatal hernia. The duodenum appeared normal. After initial EGD, the esophagus was empirically dilated with an 18 mm Savary dilator over the guidewire. Re-inspection revealed no mucosal damage. Specimens: none    Estimated Blood Loss: None           Complications:   None; patient tolerated the procedure well. Attending Attestation:  I performed the procedure. Impression:    -Normal upper endoscopy, with no endoscopic evidence of neoplasia or mucosal abnormality.     Recommendations:  - follow up with referring physician   - proceed to colonoscopy

## 2017-11-02 NOTE — ROUTINE PROCESS
Patient received to endoscopy via ambulatory . Denies SOB and pain. Oriented to room and call light. Instructed not to get OOB without assist, report pain/SOB ASAP and report any needs - verbalizes understanding. Consent for procedure obtained. IV access obtained. Sitting up in bed in no apparent distress. Family at bedside.

## 2017-11-02 NOTE — OP NOTES
COLONOSCOPY    DATE of PROCEDURE: 11/2/2017    MEDICATION:  MAC      INDICATIONS: rectal bleeding    INSTRUMENT: [id number]    PROCEDURE: After obtaining informed consent, the patient was placed in the left lateral position and sedated. The endoscope was advanced to the cecum where the appendiceal orifice and ileocecal valve were identified. On withdrawal, the colon was carefully inspected. Retroflexion was performed in the rectum. The patient was taken to the recovery area in stable condition. FINDINGS:  Prep quality was poor. Small internal hemorrhoids were present. The colonoscopy was otherwise normal.     Estimated blood loss: 0-minimal         IMPRESSION:  1. Poor prep  2. Presumed anal source of bleeding     PLAN:  1. Follow up with referring physician   2. Repeat colonoscopy age 48   3.  Treat constipation       Flakita Garay MD  [facility], [address]

## 2017-11-20 ENCOUNTER — HOSPITAL ENCOUNTER (OUTPATIENT)
Dept: NUCLEAR MEDICINE | Age: 47
Discharge: HOME | End: 2017-11-20
Attending: SURGERY
Payer: MEDICARE

## 2017-11-20 VITALS — BODY MASS INDEX: 44.41 KG/M2 | WEIGHT: 293 LBS | HEIGHT: 68 IN

## 2017-11-20 DIAGNOSIS — R10.11 RUQ PAIN: ICD-10-CM

## 2017-11-20 PROCEDURE — 74011250636 HC RX REV CODE- 250/636: Performed by: SURGERY

## 2017-11-20 PROCEDURE — 78227 HEPATOBIL SYST IMAGE W/DRUG: CPT

## 2017-11-20 RX ADMIN — SINCALIDE 3.18 MCG: 5 INJECTION, POWDER, LYOPHILIZED, FOR SOLUTION INTRAVENOUS at 11:14

## 2019-11-19 PROBLEM — I49.3 PVC'S (PREMATURE VENTRICULAR CONTRACTIONS): Status: ACTIVE | Noted: 2019-11-19

## 2020-02-01 ENCOUNTER — TELEPHONE (OUTPATIENT)
Dept: CARDIOLOGY | Age: 50
End: 2020-02-01

## 2020-02-02 NOTE — TELEPHONE ENCOUNTER
Pt called to report that she is having persistent palpitations. Has been having them \"for a while\" and sees Dr. 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 She states that she is taking 300mg Dilt ER daily and 50mg Toprol XL daily. Still having palpitations despite up-titration of the Dilt ER. States that she went to the ED at [facility] on 1/27/20 and had labs (trop, DDimer, BMP, CBC) and EKG x2 and w/u was non-revealing. She was discharged without any med changes. Of note -- the Toprol XL is not listed in her meds and she states she was started on this by her PCP. Also having increased heartburn. States her heartburn was controlled with Nexium but she switched to Pepcid about a month ago after her sister told her that Nexium causes cancer. She states that her heartburn has been uncontrolled since then. 1 -- Instructed to take an additional Toprol XL tonight and monitor effect. If no improvement -- only option is to go to the ED for repeat evaluation. Pt v/u.     2 -- For the heartburn -- try taking the Nexium again and call PCP for appt to discuss. May need GI referral. Pt v/u.        Brenden HARLEYC

## (undated) DEVICE — NDL PRT INJ NSAF BLNT 18GX1.5 --

## (undated) DEVICE — KENDALL RADIOLUCENT FOAM MONITORING ELECTRODE RECTANGULAR SHAPE: Brand: KENDALL

## (undated) DEVICE — SYR 5ML 1/5 GRAD LL NSAF LF --

## (undated) DEVICE — CONNECTOR TBNG OD5-7MM O2 END DISP

## (undated) DEVICE — BLOCK BITE AD 60FR W/ VELC STRP ADDRESSES MOST PT AND

## (undated) DEVICE — CANNULA NSL ORAL AD FOR CAPNOFLEX CO2 O2 AIRLFE

## (undated) DEVICE — SYR 3ML LL TIP 1/10ML GRAD --